# Patient Record
Sex: MALE | Race: WHITE | NOT HISPANIC OR LATINO | Employment: FULL TIME | URBAN - NONMETROPOLITAN AREA
[De-identification: names, ages, dates, MRNs, and addresses within clinical notes are randomized per-mention and may not be internally consistent; named-entity substitution may affect disease eponyms.]

---

## 2017-02-13 RX ORDER — OSELTAMIVIR PHOSPHATE 75 MG/1
75 CAPSULE ORAL DAILY
Qty: 10 CAPSULE | Refills: 0 | Status: SHIPPED | OUTPATIENT
Start: 2017-02-13 | End: 2017-07-26

## 2017-07-26 ENCOUNTER — OFFICE VISIT (OUTPATIENT)
Dept: INTERNAL MEDICINE | Facility: CLINIC | Age: 47
End: 2017-07-26

## 2017-07-26 VITALS
DIASTOLIC BLOOD PRESSURE: 70 MMHG | OXYGEN SATURATION: 98 % | SYSTOLIC BLOOD PRESSURE: 128 MMHG | RESPIRATION RATE: 14 BRPM | TEMPERATURE: 98 F | HEIGHT: 70 IN | WEIGHT: 178 LBS | HEART RATE: 78 BPM | BODY MASS INDEX: 25.48 KG/M2

## 2017-07-26 DIAGNOSIS — E78.5 HYPERLIPIDEMIA, UNSPECIFIED HYPERLIPIDEMIA TYPE: Primary | ICD-10-CM

## 2017-07-26 DIAGNOSIS — R35.1 BPH ASSOCIATED WITH NOCTURIA: ICD-10-CM

## 2017-07-26 DIAGNOSIS — N40.1 BPH ASSOCIATED WITH NOCTURIA: ICD-10-CM

## 2017-07-26 DIAGNOSIS — E55.9 VITAMIN D DEFICIENCY DISEASE: ICD-10-CM

## 2017-07-26 DIAGNOSIS — N40.1 BENIGN NON-NODULAR PROSTATIC HYPERPLASIA WITH LOWER URINARY TRACT SYMPTOMS: ICD-10-CM

## 2017-07-26 PROCEDURE — 99396 PREV VISIT EST AGE 40-64: CPT | Performed by: INTERNAL MEDICINE

## 2017-07-26 NOTE — PROGRESS NOTES
Subjective   Eliazar Dan is a 47 y.o. male and is here for a comprehensive physical exam.     Do you take any herbs or supplements that were not prescribed by a doctor? no  Are you taking calcium supplements? no  Are you taking aspirin daily? no      The following portions of the patient's history were reviewed and updated as appropriate: allergies, current medications, past family history, past medical history, past social history, past surgical history and problem list.      Review of Systems   Constitutional: Negative.    HENT: Negative.    Eyes: Negative.    Respiratory: Negative.    Cardiovascular: Negative.    Gastrointestinal: Negative.    Endocrine: Negative.    Genitourinary: Negative.    Musculoskeletal: Negative.    Skin: Negative.    Allergic/Immunologic: Negative.    Neurological: Negative.    Hematological: Negative.    Psychiatric/Behavioral: Negative.    All other systems reviewed and are negative.        Physical Exam   Constitutional: He is oriented to person, place, and time. He appears well-developed and well-nourished.   HENT:   Head: Normocephalic and atraumatic.   Right Ear: External ear normal.   Left Ear: External ear normal.   Nose: Nose normal.   Mouth/Throat: Oropharynx is clear and moist.   Eyes: Conjunctivae and EOM are normal. Pupils are equal, round, and reactive to light.   Neck: Normal range of motion. Neck supple. No thyromegaly present.   Cardiovascular: Normal rate, regular rhythm, normal heart sounds and intact distal pulses.    Pulmonary/Chest: Effort normal and breath sounds normal.   Abdominal: Soft. Bowel sounds are normal.   Genitourinary: Rectum normal, prostate normal and penis normal.   Musculoskeletal: Normal range of motion.   Neurological: He is alert and oriented to person, place, and time. He has normal reflexes.   Skin: Skin is warm and dry.   Psychiatric: He has a normal mood and affect. His behavior is normal. Judgment and thought content normal.   Nursing  note and vitals reviewed.      All  tests have been reviewed.    Assessment/Plan          1. Patient Counseling:  --Nutrition: Stressed importance of moderation in sodium/caffeine intake, saturated fat and cholesterol, caloric balance, sufficient intake of fresh fruits, vegetables, fiber, calcium and iron.  --Exercise: Stressed the importance of regular exercise.   --Injury prevention: Discussed safety belts, safety helmets, smoke detector, smoking near bedding or upholstery.   --Dental health: Discussed importance of regular tooth brushing, flossing, and dental visits.  --Immunizations reviewed.  --Discussed benefits of screening colonoscopy.  --After hours service discussed with patient    2. Discussed the patient's BMI with him.             Abnormal liver enzyme,do lab  Hyperlipidemia do lab  Right epididymal cyst small 1 cm continue to watch  Skin tags continue watch  BPH nocturia x1 do blood test continue to watch  Plantar fasciitis stable  Diastolic blood pressure slightly elevated cut down salt continue to watch, call if high  Physical today , follow up a year.do labs this month

## 2017-09-26 ENCOUNTER — APPOINTMENT (OUTPATIENT)
Dept: LAB | Facility: HOSPITAL | Age: 47
End: 2017-09-26

## 2017-09-26 LAB
25(OH)D3 SERPL-MCNC: 23.4 NG/ML
ALBUMIN SERPL-MCNC: 4.5 G/DL (ref 3.2–4.8)
ALBUMIN/GLOB SERPL: 2 G/DL (ref 1.5–2.5)
ALP SERPL-CCNC: 66 U/L (ref 25–100)
ALT SERPL W P-5'-P-CCNC: 24 U/L (ref 7–40)
ANION GAP SERPL CALCULATED.3IONS-SCNC: 9 MMOL/L (ref 3–11)
ARTICHOKE IGE QN: 116 MG/DL (ref 0–130)
AST SERPL-CCNC: 23 U/L (ref 0–33)
BASOPHILS # BLD AUTO: 0.03 10*3/MM3 (ref 0–0.2)
BASOPHILS NFR BLD AUTO: 0.4 % (ref 0–1)
BILIRUB SERPL-MCNC: 0.5 MG/DL (ref 0.3–1.2)
BILIRUB UR QL STRIP: NEGATIVE
BUN BLD-MCNC: 15 MG/DL (ref 9–23)
BUN/CREAT SERPL: 16.7 (ref 7–25)
CALCIUM SPEC-SCNC: 9.4 MG/DL (ref 8.7–10.4)
CHLORIDE SERPL-SCNC: 104 MMOL/L (ref 99–109)
CHOLEST SERPL-MCNC: 172 MG/DL (ref 0–200)
CLARITY UR: CLEAR
CO2 SERPL-SCNC: 29 MMOL/L (ref 20–31)
COLOR UR: YELLOW
CREAT BLD-MCNC: 0.9 MG/DL (ref 0.6–1.3)
DEPRECATED RDW RBC AUTO: 41.7 FL (ref 37–54)
EOSINOPHIL # BLD AUTO: 0.13 10*3/MM3 (ref 0–0.3)
EOSINOPHIL NFR BLD AUTO: 1.8 % (ref 0–3)
ERYTHROCYTE [DISTWIDTH] IN BLOOD BY AUTOMATED COUNT: 12.8 % (ref 11.3–14.5)
GFR SERPL CREATININE-BSD FRML MDRD: 90 ML/MIN/1.73
GLOBULIN UR ELPH-MCNC: 2.3 GM/DL
GLUCOSE BLD-MCNC: 92 MG/DL (ref 70–100)
GLUCOSE UR STRIP-MCNC: NEGATIVE MG/DL
HCT VFR BLD AUTO: 48.3 % (ref 38.9–50.9)
HDLC SERPL-MCNC: 42 MG/DL (ref 40–60)
HGB BLD-MCNC: 16.7 G/DL (ref 13.1–17.5)
HGB UR QL STRIP.AUTO: NEGATIVE
IMM GRANULOCYTES # BLD: 0.02 10*3/MM3 (ref 0–0.03)
IMM GRANULOCYTES NFR BLD: 0.3 % (ref 0–0.6)
KETONES UR QL STRIP: NEGATIVE
LEUKOCYTE ESTERASE UR QL STRIP.AUTO: NEGATIVE
LYMPHOCYTES # BLD AUTO: 2.12 10*3/MM3 (ref 0.6–4.8)
LYMPHOCYTES NFR BLD AUTO: 29 % (ref 24–44)
MCH RBC QN AUTO: 31 PG (ref 27–31)
MCHC RBC AUTO-ENTMCNC: 34.6 G/DL (ref 32–36)
MCV RBC AUTO: 89.8 FL (ref 80–99)
MONOCYTES # BLD AUTO: 0.65 10*3/MM3 (ref 0–1)
MONOCYTES NFR BLD AUTO: 8.9 % (ref 0–12)
NEUTROPHILS # BLD AUTO: 4.36 10*3/MM3 (ref 1.5–8.3)
NEUTROPHILS NFR BLD AUTO: 59.6 % (ref 41–71)
NITRITE UR QL STRIP: NEGATIVE
PH UR STRIP.AUTO: 5.5 [PH] (ref 5–8)
PLATELET # BLD AUTO: 236 10*3/MM3 (ref 150–450)
PMV BLD AUTO: 10.2 FL (ref 6–12)
POTASSIUM BLD-SCNC: 3.8 MMOL/L (ref 3.5–5.5)
PROT SERPL-MCNC: 6.8 G/DL (ref 5.7–8.2)
PROT UR QL STRIP: NEGATIVE
PSA SERPL-MCNC: 2.22 NG/ML (ref 0–4)
RBC # BLD AUTO: 5.38 10*6/MM3 (ref 4.2–5.76)
SODIUM BLD-SCNC: 142 MMOL/L (ref 132–146)
SP GR UR STRIP: 1.01 (ref 1–1.03)
TRIGL SERPL-MCNC: 101 MG/DL (ref 0–150)
TSH SERPL DL<=0.05 MIU/L-ACNC: 1.32 MIU/ML (ref 0.35–5.35)
UROBILINOGEN UR QL STRIP: NORMAL
WBC NRBC COR # BLD: 7.31 10*3/MM3 (ref 3.5–10.8)

## 2017-09-26 PROCEDURE — 80053 COMPREHEN METABOLIC PANEL: CPT | Performed by: INTERNAL MEDICINE

## 2017-09-26 PROCEDURE — 85025 COMPLETE CBC W/AUTO DIFF WBC: CPT | Performed by: INTERNAL MEDICINE

## 2017-09-26 PROCEDURE — 82306 VITAMIN D 25 HYDROXY: CPT | Performed by: INTERNAL MEDICINE

## 2017-09-26 PROCEDURE — 80061 LIPID PANEL: CPT | Performed by: INTERNAL MEDICINE

## 2017-09-26 PROCEDURE — 84443 ASSAY THYROID STIM HORMONE: CPT | Performed by: INTERNAL MEDICINE

## 2017-09-26 PROCEDURE — 81003 URINALYSIS AUTO W/O SCOPE: CPT | Performed by: INTERNAL MEDICINE

## 2017-09-26 PROCEDURE — 84153 ASSAY OF PSA TOTAL: CPT | Performed by: INTERNAL MEDICINE

## 2018-09-13 ENCOUNTER — OFFICE VISIT (OUTPATIENT)
Dept: INTERNAL MEDICINE | Facility: CLINIC | Age: 48
End: 2018-09-13

## 2018-09-13 VITALS
WEIGHT: 184 LBS | HEIGHT: 70 IN | OXYGEN SATURATION: 97 % | HEART RATE: 88 BPM | BODY MASS INDEX: 26.34 KG/M2 | SYSTOLIC BLOOD PRESSURE: 138 MMHG | DIASTOLIC BLOOD PRESSURE: 80 MMHG

## 2018-09-13 DIAGNOSIS — E78.5 HYPERLIPIDEMIA, UNSPECIFIED HYPERLIPIDEMIA TYPE: Primary | ICD-10-CM

## 2018-09-13 DIAGNOSIS — Z12.11 COLON CANCER SCREENING: ICD-10-CM

## 2018-09-13 DIAGNOSIS — E55.9 VITAMIN D DEFICIENCY DISEASE: ICD-10-CM

## 2018-09-13 DIAGNOSIS — N40.1 BENIGN NON-NODULAR PROSTATIC HYPERPLASIA WITH LOWER URINARY TRACT SYMPTOMS: ICD-10-CM

## 2018-09-13 PROCEDURE — 99396 PREV VISIT EST AGE 40-64: CPT | Performed by: INTERNAL MEDICINE

## 2018-09-13 NOTE — PROGRESS NOTES
Subjective   Eliazar Dan is a 48 y.o. male and is here for a comprehensive physical exam.     Do you take any herbs or supplements that were not prescribed by a doctor? no  Are you taking calcium supplements? no  Are you taking aspirin daily? no      The following portions of the patient's history were reviewed and updated as appropriate: allergies, current medications, past family history, past medical history, past social history, past surgical history and problem list.      Review of Systems   Constitutional: Negative.    HENT: Negative.    Eyes: Negative.    Respiratory: Negative.    Cardiovascular: Negative.    Gastrointestinal: Negative.    Endocrine: Negative.    Genitourinary: Negative.    Musculoskeletal: Negative.    Skin: Negative.    Allergic/Immunologic: Negative.    Neurological: Negative.    Hematological: Negative.    Psychiatric/Behavioral: Negative.    All other systems reviewed and are negative.        Physical Exam   Constitutional: He is oriented to person, place, and time. He appears well-developed and well-nourished.   HENT:   Head: Normocephalic and atraumatic.   Right Ear: External ear normal.   Left Ear: External ear normal.   Nose: Nose normal.   Mouth/Throat: Oropharynx is clear and moist.   Eyes: Pupils are equal, round, and reactive to light. Conjunctivae and EOM are normal.   Neck: Normal range of motion. Neck supple. No thyromegaly present.   Cardiovascular: Normal rate, regular rhythm, normal heart sounds and intact distal pulses.    Pulmonary/Chest: Effort normal and breath sounds normal.   Abdominal: Soft. Bowel sounds are normal.   Genitourinary: Rectum normal, prostate normal and penis normal.   Musculoskeletal: Normal range of motion.   Neurological: He is alert and oriented to person, place, and time. He has normal reflexes.   Skin: Skin is warm and dry.   Psychiatric: He has a normal mood and affect. His behavior is normal. Judgment and thought content normal.   Nursing  note and vitals reviewed.      All  tests have been reviewed.    Assessment/Plan          1. Patient Counseling:  --Nutrition: Stressed importance of moderation in sodium/caffeine intake, saturated fat and cholesterol, caloric balance, sufficient intake of fresh fruits, vegetables, fiber, calcium and iron.  --Exercise: Stressed the importance of regular exercise.   --Injury prevention: Discussed safety belts, safety helmets, smoke detector, smoking near bedding or upholstery.   --Dental health: Discussed importance of regular tooth brushing, flossing, and dental visits.  --Immunizations reviewed.  --Discussed benefits of screening colonoscopy.  --After hours service discussed with patient    2. Discussed the patient's BMI with him.            Abnormal liver enzyme,do lab  Hyperlipidemia do lab  Right epididymal cyst small 1 cm continue to watch  Skin tags continue watch  BPH nocturia x1   Plantar fasciitis start arth support ice and aleve, stretch  Colonoscopy schedule today  shingrix informed  Physical  annual

## 2018-09-21 ENCOUNTER — TELEPHONE (OUTPATIENT)
Dept: INTERNAL MEDICINE | Facility: CLINIC | Age: 48
End: 2018-09-21

## 2018-10-17 ENCOUNTER — LAB (OUTPATIENT)
Dept: LAB | Facility: HOSPITAL | Age: 48
End: 2018-10-17

## 2018-10-17 DIAGNOSIS — E55.9 AVITAMINOSIS D: ICD-10-CM

## 2018-10-17 DIAGNOSIS — N40.1 BPH ASSOCIATED WITH NOCTURIA: ICD-10-CM

## 2018-10-17 DIAGNOSIS — N40.1 BENIGN NON-NODULAR PROSTATIC HYPERPLASIA WITH LOWER URINARY TRACT SYMPTOMS: Primary | ICD-10-CM

## 2018-10-17 DIAGNOSIS — R35.1 BPH ASSOCIATED WITH NOCTURIA: ICD-10-CM

## 2018-10-17 DIAGNOSIS — E78.5 HYPERLIPIDEMIA, UNSPECIFIED HYPERLIPIDEMIA TYPE: ICD-10-CM

## 2018-10-17 LAB
25(OH)D3 SERPL-MCNC: 21.1 NG/ML
ALBUMIN SERPL-MCNC: 4.55 G/DL (ref 3.2–4.8)
ALBUMIN/GLOB SERPL: 2.2 G/DL (ref 1.5–2.5)
ALP SERPL-CCNC: 66 U/L (ref 25–100)
ALT SERPL W P-5'-P-CCNC: 38 U/L (ref 7–40)
ANION GAP SERPL CALCULATED.3IONS-SCNC: 6 MMOL/L (ref 3–11)
ARTICHOKE IGE QN: 132 MG/DL (ref 0–130)
AST SERPL-CCNC: 28 U/L (ref 0–33)
BASOPHILS # BLD AUTO: 0.02 10*3/MM3 (ref 0–0.2)
BASOPHILS NFR BLD AUTO: 0.3 % (ref 0–1)
BILIRUB SERPL-MCNC: 0.6 MG/DL (ref 0.3–1.2)
BILIRUB UR QL STRIP: NEGATIVE
BUN BLD-MCNC: 13 MG/DL (ref 9–23)
BUN/CREAT SERPL: 13.4 (ref 7–25)
CALCIUM SPEC-SCNC: 9.5 MG/DL (ref 8.7–10.4)
CHLORIDE SERPL-SCNC: 108 MMOL/L (ref 99–109)
CHOLEST SERPL-MCNC: 172 MG/DL (ref 0–200)
CLARITY UR: CLEAR
CO2 SERPL-SCNC: 30 MMOL/L (ref 20–31)
COLOR UR: YELLOW
CREAT BLD-MCNC: 0.97 MG/DL (ref 0.6–1.3)
DEPRECATED RDW RBC AUTO: 39.7 FL (ref 37–54)
EOSINOPHIL # BLD AUTO: 0.05 10*3/MM3 (ref 0–0.3)
EOSINOPHIL NFR BLD AUTO: 0.7 % (ref 0–3)
ERYTHROCYTE [DISTWIDTH] IN BLOOD BY AUTOMATED COUNT: 12.4 % (ref 11.3–14.5)
GFR SERPL CREATININE-BSD FRML MDRD: 82 ML/MIN/1.73
GLOBULIN UR ELPH-MCNC: 2.1 GM/DL
GLUCOSE BLD-MCNC: 92 MG/DL (ref 70–100)
GLUCOSE UR STRIP-MCNC: NEGATIVE MG/DL
HCT VFR BLD AUTO: 47.7 % (ref 38.9–50.9)
HDLC SERPL-MCNC: 36 MG/DL (ref 40–60)
HGB BLD-MCNC: 16.3 G/DL (ref 13.1–17.5)
HGB UR QL STRIP.AUTO: NEGATIVE
IMM GRANULOCYTES # BLD: 0.02 10*3/MM3 (ref 0–0.03)
IMM GRANULOCYTES NFR BLD: 0.3 % (ref 0–0.6)
KETONES UR QL STRIP: NEGATIVE
LEUKOCYTE ESTERASE UR QL STRIP.AUTO: NEGATIVE
LYMPHOCYTES # BLD AUTO: 2.01 10*3/MM3 (ref 0.6–4.8)
LYMPHOCYTES NFR BLD AUTO: 29 % (ref 24–44)
MCH RBC QN AUTO: 30.5 PG (ref 27–31)
MCHC RBC AUTO-ENTMCNC: 34.2 G/DL (ref 32–36)
MCV RBC AUTO: 89.2 FL (ref 80–99)
MONOCYTES # BLD AUTO: 0.6 10*3/MM3 (ref 0–1)
MONOCYTES NFR BLD AUTO: 8.7 % (ref 0–12)
NEUTROPHILS # BLD AUTO: 4.25 10*3/MM3 (ref 1.5–8.3)
NEUTROPHILS NFR BLD AUTO: 61.3 % (ref 41–71)
NITRITE UR QL STRIP: NEGATIVE
PH UR STRIP.AUTO: 5.5 [PH] (ref 5–8)
PLATELET # BLD AUTO: 260 10*3/MM3 (ref 150–450)
PMV BLD AUTO: 9.9 FL (ref 6–12)
POTASSIUM BLD-SCNC: 4.3 MMOL/L (ref 3.5–5.5)
PROT SERPL-MCNC: 6.6 G/DL (ref 5.7–8.2)
PROT UR QL STRIP: NEGATIVE
PSA SERPL-MCNC: 2.88 NG/ML (ref 0–4)
RBC # BLD AUTO: 5.35 10*6/MM3 (ref 4.2–5.76)
SODIUM BLD-SCNC: 144 MMOL/L (ref 132–146)
SP GR UR STRIP: 1.02 (ref 1–1.03)
TRIGL SERPL-MCNC: 64 MG/DL (ref 0–150)
TSH SERPL DL<=0.05 MIU/L-ACNC: 1.32 MIU/ML (ref 0.35–5.35)
UROBILINOGEN UR QL STRIP: NORMAL
WBC NRBC COR # BLD: 6.93 10*3/MM3 (ref 3.5–10.8)

## 2018-10-17 PROCEDURE — 80053 COMPREHEN METABOLIC PANEL: CPT

## 2018-10-17 PROCEDURE — 81003 URINALYSIS AUTO W/O SCOPE: CPT

## 2018-10-17 PROCEDURE — 82306 VITAMIN D 25 HYDROXY: CPT

## 2018-10-17 PROCEDURE — 36415 COLL VENOUS BLD VENIPUNCTURE: CPT

## 2018-10-17 PROCEDURE — 84443 ASSAY THYROID STIM HORMONE: CPT

## 2018-10-17 PROCEDURE — 80061 LIPID PANEL: CPT

## 2018-10-17 PROCEDURE — 84153 ASSAY OF PSA TOTAL: CPT

## 2018-10-17 PROCEDURE — 85025 COMPLETE CBC W/AUTO DIFF WBC: CPT

## 2019-08-05 ENCOUNTER — OFFICE VISIT (OUTPATIENT)
Dept: INTERNAL MEDICINE | Facility: CLINIC | Age: 49
End: 2019-08-05

## 2019-08-05 VITALS
HEIGHT: 70 IN | HEART RATE: 78 BPM | TEMPERATURE: 98.5 F | WEIGHT: 185 LBS | OXYGEN SATURATION: 98 % | BODY MASS INDEX: 26.48 KG/M2 | DIASTOLIC BLOOD PRESSURE: 86 MMHG | SYSTOLIC BLOOD PRESSURE: 128 MMHG

## 2019-08-05 DIAGNOSIS — R42 DIZZINESS: ICD-10-CM

## 2019-08-05 DIAGNOSIS — H61.22 IMPACTED CERUMEN OF LEFT EAR: Primary | ICD-10-CM

## 2019-08-05 PROCEDURE — 69210 REMOVE IMPACTED EAR WAX UNI: CPT | Performed by: PHYSICIAN ASSISTANT

## 2019-08-05 PROCEDURE — 99213 OFFICE O/P EST LOW 20 MIN: CPT | Performed by: PHYSICIAN ASSISTANT

## 2019-08-05 NOTE — PROGRESS NOTES
Eliazar Dan is a 49 y.o. male.     Subjective   History of Present Illness   He had an ache in the left ear and mild sore throat for 1-2 days last week which has since resolved, but since then he has noticed dizziness if he bends forward and stands up quickly.  Dizziness does not occur with any other body position changes.  No rhinorrhea, postnasal drip, cough, headache, sneezing, sinus pressure, fever or chills.  He took Tylenol once which helped the ear ache and sore throat.         The following portions of the patient's history were reviewed and updated as appropriate: allergies, current medications, past family history, past medical history, past social history, past surgical history and problem list.    Review of Systems   Constitutional: Negative for activity change, appetite change, chills, diaphoresis, fatigue, fever and unexpected weight change.   HENT: Positive for ear pain and sore throat. Negative for congestion, dental problem, ear discharge, facial swelling, mouth sores, postnasal drip, rhinorrhea, sinus pressure, trouble swallowing and voice change.    Eyes: Negative for visual disturbance.   Respiratory: Negative for cough, chest tightness, shortness of breath and wheezing.    Cardiovascular: Negative for chest pain, palpitations and leg swelling.   Neurological: Positive for dizziness. Negative for speech difficulty, weakness, numbness and headaches.   Hematological: Negative for adenopathy. Does not bruise/bleed easily.   Psychiatric/Behavioral: Negative for agitation, confusion, dysphoric mood, self-injury and suicidal ideas. The patient is not nervous/anxious.          Objective    Physical Exam   Constitutional: He is oriented to person, place, and time. He appears well-developed and well-nourished. No distress.   HENT:   Head: Normocephalic and atraumatic.   Right Ear: External ear normal.   Mouth/Throat: Oropharynx is clear and moist. No oropharyngeal exudate.   Partial cerumen impaction  of the left ear canal. Visible portion of TM appears normal, though 1/4 of TM is not visible due to impaction.    Eyes: Conjunctivae and EOM are normal. Pupils are equal, round, and reactive to light.   Neck: Normal range of motion. Neck supple.   Cardiovascular: Normal rate, regular rhythm and normal heart sounds. Exam reveals no gallop and no friction rub.   No murmur heard.  Pulmonary/Chest: Effort normal and breath sounds normal. No stridor. No respiratory distress. He has no wheezes. He has no rales. He exhibits no tenderness.   Abdominal: Soft. Bowel sounds are normal. He exhibits no mass. There is no tenderness. No hernia.   Musculoskeletal: Normal range of motion. He exhibits no edema, tenderness or deformity.   Lymphadenopathy:     He has no cervical adenopathy.   Neurological: He is alert and oriented to person, place, and time. He displays normal reflexes. No cranial nerve deficit or sensory deficit. He exhibits normal muscle tone. Coordination normal.   Negative [place] [employer] [place] maneuvers.    Skin: Skin is warm and dry. Capillary refill takes less than 2 seconds. No rash noted. He is not diaphoretic.   Psychiatric: He has a normal mood and affect. His behavior is normal. Judgment and thought content normal.   Nursing note and vitals reviewed.      Ear Cerumen Removal  Date/Time: 8/5/2019 10:37 AM  Performed by: Mary Kay Olguin PA  Authorized by: Mary Kay Olguin PA   Consent: Verbal consent obtained. Written consent not obtained.  Risks and benefits: risks, benefits and alternatives were discussed  Consent given by: patient  Patient understanding: patient states understanding of the procedure being performed  Patient consent: the patient's understanding of the procedure matches consent given  Procedure consent: procedure consent matches procedure scheduled  Required items: required blood products, implants, devices, and special equipment available  Patient identity confirmed: verbally with  "patient  Time out: Immediately prior to procedure a \"time out\" was called to verify the correct patient, procedure, equipment, support staff and site/side marked as required.    Anesthesia:  Local Anesthetic: none  Location details: left ear  Patient tolerance: Patient tolerated the procedure well with no immediate complications  Comments: Left partial cerumen impaction was treated with curette and irrigation, though the impaction did not fully resolve.   Procedure type: instrumentation (And irrigation)      Sedation:  Patient sedated: no          /86   Pulse 78   Temp 98.5 °F (36.9 °C)   Ht 177.8 cm (70\")   Wt 83.9 kg (185 lb)   SpO2 98%   BMI 26.54 kg/m²     Nursing note and vitals reviewed.        Assessment/Plan   Eliazar was seen today for earache.    Diagnoses and all orders for this visit:    Impacted cerumen of left ear  -     Ear Cerumen Removal  Begin Debrox drops in the left ear. Return in 1 week if symptoms fail to resolve.     Dizziness  Begin Flonase daily.         Call or RTC if symptoms worsen or persist.        "

## 2019-09-16 ENCOUNTER — OFFICE VISIT (OUTPATIENT)
Dept: INTERNAL MEDICINE | Facility: CLINIC | Age: 49
End: 2019-09-16

## 2019-09-16 VITALS
DIASTOLIC BLOOD PRESSURE: 80 MMHG | TEMPERATURE: 97.5 F | WEIGHT: 182.8 LBS | SYSTOLIC BLOOD PRESSURE: 124 MMHG | BODY MASS INDEX: 26.17 KG/M2 | OXYGEN SATURATION: 98 % | HEIGHT: 70 IN | HEART RATE: 82 BPM

## 2019-09-16 DIAGNOSIS — N40.1 BENIGN NON-NODULAR PROSTATIC HYPERPLASIA WITH LOWER URINARY TRACT SYMPTOMS: ICD-10-CM

## 2019-09-16 DIAGNOSIS — Z12.11 COLON CANCER SCREENING: ICD-10-CM

## 2019-09-16 DIAGNOSIS — E55.9 VITAMIN D DEFICIENCY DISEASE: ICD-10-CM

## 2019-09-16 DIAGNOSIS — Z00.00 ANNUAL PHYSICAL EXAM: ICD-10-CM

## 2019-09-16 DIAGNOSIS — E78.5 HYPERLIPIDEMIA, UNSPECIFIED HYPERLIPIDEMIA TYPE: Primary | ICD-10-CM

## 2019-09-16 PROCEDURE — 99396 PREV VISIT EST AGE 40-64: CPT | Performed by: INTERNAL MEDICINE

## 2019-09-16 NOTE — PROGRESS NOTES
Subjective   Eliazar Dan is a 49 y.o. male and is here for a comprehensive physical exam.     Do you take any herbs or supplements that were not prescribed by a doctor? no  Are you taking calcium supplements? no  Are you taking aspirin daily? no      The following portions of the patient's history were reviewed and updated as appropriate: allergies, current medications, past family history, past medical history, past social history, past surgical history and problem list.      Review of Systems   Constitutional: Negative.    HENT: Negative.    Eyes: Negative.    Respiratory: Negative.    Cardiovascular: Negative.    Gastrointestinal: Negative.    Endocrine: Negative.    Genitourinary: Negative.    Musculoskeletal: Negative.    Skin: Negative.    Allergic/Immunologic: Negative.    Neurological: Negative.    Hematological: Negative.    Psychiatric/Behavioral: Negative.    All other systems reviewed and are negative.        Physical Exam   Constitutional: He is oriented to person, place, and time. He appears well-developed and well-nourished.   HENT:   Head: Normocephalic and atraumatic.   Right Ear: External ear normal.   Left Ear: External ear normal.   Nose: Nose normal.   Mouth/Throat: Oropharynx is clear and moist.   Eyes: Conjunctivae and EOM are normal. Pupils are equal, round, and reactive to light.   Neck: Normal range of motion. Neck supple. No thyromegaly present.   Cardiovascular: Normal rate, regular rhythm, normal heart sounds and intact distal pulses.   Pulmonary/Chest: Effort normal and breath sounds normal.   Abdominal: Soft. Bowel sounds are normal.   Genitourinary: Rectum normal, prostate normal and penis normal.   Musculoskeletal: Normal range of motion.   Neurological: He is alert and oriented to person, place, and time. He has normal reflexes.   Skin: Skin is warm and dry.   Psychiatric: He has a normal mood and affect. His behavior is normal. Judgment and thought content normal.   Nursing  note and vitals reviewed.      All  tests have been reviewed.    Assessment/Plan          1. Patient Counseling:  --Nutrition: Stressed importance of moderation in sodium/caffeine intake, saturated fat and cholesterol, caloric balance, sufficient intake of fresh fruits, vegetables, fiber, calcium and iron.  --Exercise: Stressed the importance of regular exercise.   --Injury prevention: Discussed safety belts, safety helmets, smoke detector, smoking near bedding or upholstery.   --Dental health: Discussed importance of regular tooth brushing, flossing, and dental visits.  --Immunizations reviewed.  --Discussed benefits of screening colonoscopy.  --After hours service discussed with patient    2. Discussed the patient's BMI with him.                Hyperlipidemia do lab  Right epididymal cyst small 1 cm continue to watch  Skin tags continue watch  BPH nocturia x1   Plantar fasciitis start arth support ice and aleve, stretch  Colonoscopy schedule today  bph 3+ WATCH NOCTURIA X 1  shingrix informed  Physical  annual

## 2019-10-18 ENCOUNTER — APPOINTMENT (OUTPATIENT)
Dept: LAB | Facility: HOSPITAL | Age: 49
End: 2019-10-18

## 2019-10-18 LAB
ALBUMIN SERPL-MCNC: 5 G/DL (ref 3.5–5.2)
ALBUMIN/GLOB SERPL: 1.9 G/DL
ALP SERPL-CCNC: 65 U/L (ref 39–117)
ALT SERPL W P-5'-P-CCNC: 28 U/L (ref 1–41)
ANION GAP SERPL CALCULATED.3IONS-SCNC: 11 MMOL/L (ref 5–15)
AST SERPL-CCNC: 21 U/L (ref 1–40)
BASOPHILS # BLD AUTO: 0.04 10*3/MM3 (ref 0–0.2)
BASOPHILS NFR BLD AUTO: 0.5 % (ref 0–1.5)
BILIRUB SERPL-MCNC: 0.4 MG/DL (ref 0.2–1.2)
BILIRUB UR QL STRIP: NEGATIVE
BUN BLD-MCNC: 14 MG/DL (ref 6–20)
BUN/CREAT SERPL: 15.2 (ref 7–25)
CALCIUM SPEC-SCNC: 9.5 MG/DL (ref 8.6–10.5)
CHLORIDE SERPL-SCNC: 105 MMOL/L (ref 98–107)
CHOLEST SERPL-MCNC: 191 MG/DL (ref 0–200)
CLARITY UR: CLEAR
CO2 SERPL-SCNC: 29 MMOL/L (ref 22–29)
COLOR UR: YELLOW
CREAT BLD-MCNC: 0.92 MG/DL (ref 0.76–1.27)
DEPRECATED RDW RBC AUTO: 37.7 FL (ref 37–54)
EOSINOPHIL # BLD AUTO: 0.11 10*3/MM3 (ref 0–0.4)
EOSINOPHIL NFR BLD AUTO: 1.5 % (ref 0.3–6.2)
ERYTHROCYTE [DISTWIDTH] IN BLOOD BY AUTOMATED COUNT: 11.8 % (ref 12.3–15.4)
GFR SERPL CREATININE-BSD FRML MDRD: 87 ML/MIN/1.73
GLOBULIN UR ELPH-MCNC: 2.7 GM/DL
GLUCOSE BLD-MCNC: 110 MG/DL (ref 65–99)
GLUCOSE UR STRIP-MCNC: NEGATIVE MG/DL
HCT VFR BLD AUTO: 52.7 % (ref 37.5–51)
HDLC SERPL-MCNC: 42 MG/DL (ref 40–60)
HGB BLD-MCNC: 17.4 G/DL (ref 13–17.7)
HGB UR QL STRIP.AUTO: NEGATIVE
IMM GRANULOCYTES # BLD AUTO: 0.04 10*3/MM3 (ref 0–0.05)
IMM GRANULOCYTES NFR BLD AUTO: 0.5 % (ref 0–0.5)
KETONES UR QL STRIP: NEGATIVE
LDLC SERPL CALC-MCNC: 132 MG/DL (ref 0–100)
LDLC/HDLC SERPL: 3.15 {RATIO}
LEUKOCYTE ESTERASE UR QL STRIP.AUTO: NEGATIVE
LYMPHOCYTES # BLD AUTO: 2.15 10*3/MM3 (ref 0.7–3.1)
LYMPHOCYTES NFR BLD AUTO: 28.8 % (ref 19.6–45.3)
MCH RBC QN AUTO: 29.2 PG (ref 26.6–33)
MCHC RBC AUTO-ENTMCNC: 33 G/DL (ref 31.5–35.7)
MCV RBC AUTO: 88.6 FL (ref 79–97)
MONOCYTES # BLD AUTO: 0.64 10*3/MM3 (ref 0.1–0.9)
MONOCYTES NFR BLD AUTO: 8.6 % (ref 5–12)
NEUTROPHILS # BLD AUTO: 4.49 10*3/MM3 (ref 1.7–7)
NEUTROPHILS NFR BLD AUTO: 60.1 % (ref 42.7–76)
NITRITE UR QL STRIP: NEGATIVE
NRBC BLD AUTO-RTO: 0 /100 WBC (ref 0–0.2)
PH UR STRIP.AUTO: 5.5 [PH] (ref 5–8)
PLATELET # BLD AUTO: 245 10*3/MM3 (ref 140–450)
PMV BLD AUTO: 9.5 FL (ref 6–12)
POTASSIUM BLD-SCNC: 4 MMOL/L (ref 3.5–5.2)
PROT SERPL-MCNC: 7.7 G/DL (ref 6–8.5)
PROT UR QL STRIP: NEGATIVE
RBC # BLD AUTO: 5.95 10*6/MM3 (ref 4.14–5.8)
SODIUM BLD-SCNC: 145 MMOL/L (ref 136–145)
SP GR UR STRIP: 1.01 (ref 1–1.03)
TRIGL SERPL-MCNC: 83 MG/DL (ref 0–150)
TSH SERPL DL<=0.05 MIU/L-ACNC: 2.27 UIU/ML (ref 0.27–4.2)
UROBILINOGEN UR QL STRIP: NORMAL
VLDLC SERPL-MCNC: 16.6 MG/DL
WBC NRBC COR # BLD: 7.47 10*3/MM3 (ref 3.4–10.8)

## 2019-10-18 PROCEDURE — 84443 ASSAY THYROID STIM HORMONE: CPT | Performed by: INTERNAL MEDICINE

## 2019-10-18 PROCEDURE — 36415 COLL VENOUS BLD VENIPUNCTURE: CPT | Performed by: INTERNAL MEDICINE

## 2019-10-18 PROCEDURE — 85025 COMPLETE CBC W/AUTO DIFF WBC: CPT | Performed by: INTERNAL MEDICINE

## 2019-10-18 PROCEDURE — 80061 LIPID PANEL: CPT | Performed by: INTERNAL MEDICINE

## 2019-10-18 PROCEDURE — 80053 COMPREHEN METABOLIC PANEL: CPT | Performed by: INTERNAL MEDICINE

## 2019-10-18 PROCEDURE — 81003 URINALYSIS AUTO W/O SCOPE: CPT | Performed by: INTERNAL MEDICINE

## 2019-10-18 PROCEDURE — 84153 ASSAY OF PSA TOTAL: CPT | Performed by: INTERNAL MEDICINE

## 2019-10-19 LAB — PSA SERPL-MCNC: 3.2 NG/ML (ref 0–4)

## 2019-10-20 DIAGNOSIS — N40.1 BENIGN PROSTATIC HYPERPLASIA WITH LOWER URINARY TRACT SYMPTOMS, SYMPTOM DETAILS UNSPECIFIED: Primary | ICD-10-CM

## 2019-10-25 ENCOUNTER — RESULTS ENCOUNTER (OUTPATIENT)
Dept: INTERNAL MEDICINE | Facility: CLINIC | Age: 49
End: 2019-10-25

## 2019-10-25 DIAGNOSIS — N40.1 BENIGN PROSTATIC HYPERPLASIA WITH LOWER URINARY TRACT SYMPTOMS, SYMPTOM DETAILS UNSPECIFIED: ICD-10-CM

## 2020-01-14 RX ORDER — SODIUM CHLORIDE 0.9 % (FLUSH) 0.9 %
10 SYRINGE (ML) INJECTION AS NEEDED
Status: CANCELLED | OUTPATIENT
Start: 2020-01-14

## 2020-01-14 RX ORDER — SODIUM CHLORIDE, SODIUM LACTATE, POTASSIUM CHLORIDE, CALCIUM CHLORIDE 600; 310; 30; 20 MG/100ML; MG/100ML; MG/100ML; MG/100ML
9 INJECTION, SOLUTION INTRAVENOUS CONTINUOUS PRN
Status: CANCELLED | OUTPATIENT
Start: 2020-01-14

## 2020-01-14 RX ORDER — FAMOTIDINE 20 MG/1
20 TABLET, FILM COATED ORAL
Status: CANCELLED | OUTPATIENT
Start: 2020-01-14

## 2020-01-14 RX ORDER — SODIUM CHLORIDE 0.9 % (FLUSH) 0.9 %
10 SYRINGE (ML) INJECTION EVERY 12 HOURS SCHEDULED
Status: CANCELLED | OUTPATIENT
Start: 2020-01-14

## 2020-01-14 RX ORDER — LIDOCAINE HYDROCHLORIDE 10 MG/ML
0.5 INJECTION, SOLUTION EPIDURAL; INFILTRATION; INTRACAUDAL; PERINEURAL ONCE AS NEEDED
Status: CANCELLED | OUTPATIENT
Start: 2020-01-14

## 2020-01-15 ENCOUNTER — HOSPITAL ENCOUNTER (OUTPATIENT)
Facility: HOSPITAL | Age: 50
Setting detail: HOSPITAL OUTPATIENT SURGERY
Discharge: HOME | End: 2020-01-15
Attending: UROLOGY | Admitting: UROLOGY

## 2020-01-15 VITALS
WEIGHT: 185 LBS | HEART RATE: 83 BPM | TEMPERATURE: 97.5 F | BODY MASS INDEX: 27.4 KG/M2 | RESPIRATION RATE: 20 BRPM | SYSTOLIC BLOOD PRESSURE: 141 MMHG | DIASTOLIC BLOOD PRESSURE: 89 MMHG | OXYGEN SATURATION: 96 % | HEIGHT: 69 IN

## 2020-01-15 DIAGNOSIS — R97.20 ELEVATED PSA: ICD-10-CM

## 2020-01-15 PROCEDURE — G0416 PROSTATE BIOPSY, ANY MTHD: HCPCS | Performed by: UROLOGY

## 2020-01-15 PROCEDURE — 25010000003 LIDOCAINE 1 % SOLUTION: Performed by: UROLOGY

## 2020-01-15 RX ORDER — LIDOCAINE HYDROCHLORIDE 10 MG/ML
INJECTION, SOLUTION INFILTRATION; PERINEURAL AS NEEDED
Status: DISCONTINUED | OUTPATIENT
Start: 2020-01-15 | End: 2020-01-15 | Stop reason: HOSPADM

## 2020-01-15 RX ORDER — MAGNESIUM HYDROXIDE 1200 MG/15ML
LIQUID ORAL AS NEEDED
Status: DISCONTINUED | OUTPATIENT
Start: 2020-01-15 | End: 2020-01-15 | Stop reason: HOSPADM

## 2020-01-15 RX ORDER — CIPROFLOXACIN 500 MG/1
500 TABLET, FILM COATED ORAL 2 TIMES DAILY
COMMUNITY
End: 2020-08-25

## 2020-01-15 NOTE — BRIEF OP NOTE
PROSTATE BIOPSY  Progress Note    Eliazar Dan  1/15/2020    Pre-op Diagnosis:   Elevated PSA       Post-Op Diagnosis Codes:  Same    Procedure/CPT® Codes:      Procedure(s):  TRANSRECTAL ULTRASOUND GUIDED BIOPSY of the prostate    Surgeon(s):  Ritesh Ribera MD    Anesthesia: Local  Staff:   Circulator: Dante Wallace RN  Scrub Person: Zena Wood  Other: Amrita Levy RN    Estimated Blood Loss: Less than 10 mL's    Urine Voided: * No values recorded between 1/15/2020  7:35 AM and 1/15/2020  7:55 AM *    Specimens:                Specimens     ID Source Type Tests Collected By Collected At Frozen?      A Prostate Tissue · TISSUE PATHOLOGY EXAM   Ritesh Ribera MD 1/15/20 0743 No     Description: Left sided prostate    This specimen was not marked as sent.    B Prostate Tissue · TISSUE PATHOLOGY EXAM   Ritesh Ribera MD 1/15/20 0745 No     Description: Right Sided Prostate tissue    This specimen was not marked as sent.                Drains: * No LDAs found *    Findings: Normal exam  Complications: None, to recovery room stable    Ritesh Ribera MD     Date: 1/15/2020  Time: 7:55 AM

## 2020-01-15 NOTE — H&P
"Pre-Op H&P  Eliazar Dan  [id number]  1970    Chief complaint: Increased urinary frequency and nocturia    HPI:  Patient is a 50 y.o.male who presents with benign non-nodular prostatic hyperplasia with urinary tract symptoms.  He has an increase PSA velocity, despite absolute value being normal age adjusted.  PSA history shows an increase from 0.8 in 1 out of 15 to 3.2 in 10 out of 19, which is a 400% increase in less than 5 years.  He has complaints of increased urinary frequency, nocturia and abnormal urinary flow, he denies incontinence, dysuria, gross hematuria, prostate pressure and pain.      He presents today for a transrectal ultrasound guided biopsy of the prostate.    Review of Systems:  General ROS: negative for chills, fever or skin lesions;  No changes since last office visit  Cardiovascular ROS: no chest pain or dyspnea on exertion  Respiratory ROS: no cough, shortness of breath, or wheezing    Allergies: No Known Allergies    Home Meds:    No current facility-administered medications on file prior to encounter.      Current Outpatient Medications on File Prior to Encounter   Medication Sig Dispense Refill   • ciprofloxacin (CIPRO) 500 MG tablet Take 500 mg by mouth 2 (Two) Times a Day.         PMH:   Past Medical History:   Diagnosis Date   • Abnormal liver enzymes    • Epididymal cyst     Right, 1 cm continue to watch   • Plantar fasciitis     Stable   • Skin tag      PSH:    Past Surgical History:   Procedure Laterality Date   • WISDOM TOOTH EXTRACTION  2008       Social History:   Tobacco:   Social History     Tobacco Use   Smoking Status Never Smoker   Smokeless Tobacco Never Used      Alcohol:     Social History     Substance and Sexual Activity   Alcohol Use Yes    Comment: Rarely       Vitals:           /92 (BP Location: Right arm, Patient Position: Lying)   Pulse 93   Temp 97.4 °F (36.3 °C) (Temporal)   Resp 18   Ht 175.3 cm (69\")   Wt 83.9 kg (185 lb)   SpO2 97%   BMI " 27.32 kg/m²     Physical Exam:  General Appearance:    Alert, cooperative, no distress, appears stated age   Head:    Normocephalic, without obvious abnormality, atraumatic   Lungs:     Clear to auscultation bilaterally, respirations unlabored    Heart:   Regular rate and rhythm, S1 and S2 normal, no murmur, rub    or gallop    Abdomen:    Soft, non-tender.  +bowel sounds   Breast Exam:    deferred   Genitalia:    deferred   Extremities:   Extremities normal, atraumatic, no cyanosis or edema   Skin:   Skin color, texture, turgor normal, no rashes or lesions   Neurologic:   Grossly intact   Results Review  LABS:  Lab Results   Component Value Date    WBC 7.47 10/18/2019    HGB 17.4 10/18/2019    HCT 52.7 (H) 10/18/2019    MCV 88.6 10/18/2019     10/18/2019    NEUTROABS 4.49 10/18/2019    GLUCOSE 110 (H) 10/18/2019    BUN 14 10/18/2019    CREATININE 0.92 10/18/2019    EGFRIFNONA 87 10/18/2019     10/18/2019    K 4.0 10/18/2019     10/18/2019    CO2 29.0 10/18/2019    CALCIUM 9.5 10/18/2019    ALBUMIN 5.00 10/18/2019    AST 21 10/18/2019    ALT 28 10/18/2019    BILITOT 0.4 10/18/2019       RADIOLOGY:  Imaging Results (Last 72 Hours)     ** No results found for the last 72 hours. **          I reviewed the patient's new clinical results.    Cancer Staging (if applicable)  Cancer Patient: __ yes __no __unknown; If yes, clinical stage T:__ N:__M:__, stage group or __N/A    Impression:  Benign, non-nodular prostatic hyperplasia with urinary tract symptoms    Plan:   Transrectal ultrasound guided biopsy of the prostate    China Irene PA-C   1/15/2020   6:46 AM

## 2020-01-15 NOTE — OP NOTE
Diagnosis: Elevated PSA  Postoperative diagnosis: Same  Procedure performed transrectal ultrasound-guided prostate biopsy  Anesthesia: Local  Surgeon: Bacilio  Indications: This a 50-year-old white male whose had a PSA rise to 3.2 from mild well less than 1 and in previous years.  Represents a dramatic rise in his PSA and his examination of his prostate is unrevealing.  Operative description patient was placed in the operating table in the left lateral cubitus position.  The Front FlipK transrectal ultrasound probe was introduced into the rectum and 10 mL of 1% Xylocaine was injected at the base and apex of the prostate for local anesthesia.  Real-time ultrasonography of the prostate was then performed under direct vision and and measurements taken.  His prostate measures 42.7 mm in width 26.3 mm in height 42.7 mm in length for a volume of 25.1 cm³.  It was symmetrical and homogeneous throughout.  The 18-gauge Biopty gun was then introduced and 6 biopsies were taken from the right and 6 from the left.  He tolerated the procedure well and the probe was removed atraumatically he was taken to the recovery room in stable condition there are no complications.    The hospital dictation system is broken.  This was done on a voice recognition system.  There may be significant transcription errors.

## 2020-01-16 LAB
CYTO UR: NORMAL
LAB AP CASE REPORT: NORMAL
LAB AP CLINICAL INFORMATION: NORMAL
PATH REPORT.FINAL DX SPEC: NORMAL
PATH REPORT.GROSS SPEC: NORMAL

## 2020-06-29 PROCEDURE — U0003 INFECTIOUS AGENT DETECTION BY NUCLEIC ACID (DNA OR RNA); SEVERE ACUTE RESPIRATORY SYNDROME CORONAVIRUS 2 (SARS-COV-2) (CORONAVIRUS DISEASE [COVID-19]), AMPLIFIED PROBE TECHNIQUE, MAKING USE OF HIGH THROUGHPUT TECHNOLOGIES AS DESCRIBED BY CMS-2020-01-R: HCPCS | Performed by: NURSE PRACTITIONER

## 2020-07-01 ENCOUNTER — TELEPHONE (OUTPATIENT)
Dept: URGENT CARE | Facility: CLINIC | Age: 50
End: 2020-07-01

## 2020-07-01 NOTE — TELEPHONE ENCOUNTER
Spoke with patient in regards to negative COVID results.  Advised patient to quarantine for 10 days per CDC recommendation.

## 2020-09-11 ENCOUNTER — APPOINTMENT (OUTPATIENT)
Dept: PREADMISSION TESTING | Facility: HOSPITAL | Age: 50
End: 2020-09-11

## 2020-09-17 ENCOUNTER — OFFICE VISIT (OUTPATIENT)
Dept: INTERNAL MEDICINE | Facility: CLINIC | Age: 50
End: 2020-09-17

## 2020-09-17 VITALS
BODY MASS INDEX: 25.97 KG/M2 | WEIGHT: 181.4 LBS | DIASTOLIC BLOOD PRESSURE: 84 MMHG | HEART RATE: 85 BPM | OXYGEN SATURATION: 97 % | HEIGHT: 70 IN | SYSTOLIC BLOOD PRESSURE: 140 MMHG | TEMPERATURE: 97.8 F

## 2020-09-17 DIAGNOSIS — Z00.00 PE (PHYSICAL EXAM), ANNUAL: Primary | ICD-10-CM

## 2020-09-17 PROCEDURE — 99396 PREV VISIT EST AGE 40-64: CPT | Performed by: INTERNAL MEDICINE

## 2020-09-17 NOTE — PROGRESS NOTES
Subjective   Eliazar Dan is a 50 y.o. male and is here for a comprehensive physical exam.     Do you take any herbs or supplements that were not prescribed by a doctor? no  Are you taking calcium supplements? no  Are you taking aspirin daily? no      The following portions of the patient's history were reviewed and updated as appropriate: allergies, current medications, past family history, past medical history, past social history, past surgical history and problem list.      Review of Systems   Constitutional: Negative.    HENT: Negative.    Eyes: Negative.    Respiratory: Negative.    Cardiovascular: Negative.    Gastrointestinal: Negative.    Endocrine: Negative.    Genitourinary: Negative.    Musculoskeletal: Negative.    Skin: Negative.    Allergic/Immunologic: Negative.    Neurological: Negative.    Hematological: Negative.    Psychiatric/Behavioral: Negative.    All other systems reviewed and are negative.        Physical Exam  Neck:      Musculoskeletal: Neck supple.   Cardiovascular:      Rate and Rhythm: Normal rate and regular rhythm.      Heart sounds: Normal heart sounds.   Pulmonary:      Effort: Pulmonary effort is normal.      Breath sounds: Normal breath sounds.   Abdominal:      General: Bowel sounds are normal.   Skin:     General: Skin is warm.   Neurological:      Mental Status: He is alert and oriented to person, place, and time.         All  tests have been reviewed.    Assessment/Plan          1. Patient Counseling:  --Nutrition: Stressed importance of moderation in sodium/caffeine intake, saturated fat and cholesterol, caloric balance, sufficient intake of fresh fruits, vegetables, fiber, calcium and iron.  --Exercise: Stressed the importance of regular exercise.   --Injury prevention: Discussed safety belts, safety helmets, smoke detector, smoking near bedding or upholstery.   --Dental health: Discussed importance of regular tooth brushing, flossing, and dental  visits.  --Immunizations reviewed.  --Discussed benefits of screening colonoscopy.  --After hours service discussed with patient    2. Discussed the patient's BMI with him.            Hyperlipidemia do lab  Right epididymal cyst small 1 cm continue to watch  Skin tags continue watch  BPH nocturia x1   Colonoscopy scheduled  bph 3+ WATCH NOCTURIA X 1  PSA elevation seen by uro, bx negative, follow up with uro  shingrix informed  Physical  annual

## 2020-09-19 ENCOUNTER — LAB (OUTPATIENT)
Dept: LAB | Facility: HOSPITAL | Age: 50
End: 2020-09-19

## 2020-09-19 LAB
ALBUMIN SERPL-MCNC: 4.4 G/DL (ref 3.5–5.2)
ALBUMIN/GLOB SERPL: 1.4 G/DL
ALP SERPL-CCNC: 72 U/L (ref 39–117)
ALT SERPL W P-5'-P-CCNC: 31 U/L (ref 1–41)
ANION GAP SERPL CALCULATED.3IONS-SCNC: 11.4 MMOL/L (ref 5–15)
AST SERPL-CCNC: 23 U/L (ref 1–40)
BASOPHILS # BLD AUTO: 0.06 10*3/MM3 (ref 0–0.2)
BASOPHILS NFR BLD AUTO: 0.6 % (ref 0–1.5)
BILIRUB SERPL-MCNC: 0.6 MG/DL (ref 0–1.2)
BUN SERPL-MCNC: 12 MG/DL (ref 6–20)
BUN/CREAT SERPL: 12 (ref 7–25)
CALCIUM SPEC-SCNC: 9.8 MG/DL (ref 8.6–10.5)
CHLORIDE SERPL-SCNC: 104 MMOL/L (ref 98–107)
CHOLEST SERPL-MCNC: 172 MG/DL (ref 0–200)
CO2 SERPL-SCNC: 26.6 MMOL/L (ref 22–29)
CREAT SERPL-MCNC: 1 MG/DL (ref 0.76–1.27)
DEPRECATED RDW RBC AUTO: 40.9 FL (ref 37–54)
EOSINOPHIL # BLD AUTO: 0.14 10*3/MM3 (ref 0–0.4)
EOSINOPHIL NFR BLD AUTO: 1.4 % (ref 0.3–6.2)
ERYTHROCYTE [DISTWIDTH] IN BLOOD BY AUTOMATED COUNT: 12.4 % (ref 12.3–15.4)
GFR SERPL CREATININE-BSD FRML MDRD: 78 ML/MIN/1.73
GLOBULIN UR ELPH-MCNC: 3.1 GM/DL
GLUCOSE SERPL-MCNC: 95 MG/DL (ref 65–99)
HCT VFR BLD AUTO: 50.2 % (ref 37.5–51)
HCV AB SER DONR QL: NORMAL
HDLC SERPL-MCNC: 41 MG/DL (ref 40–60)
HGB BLD-MCNC: 16.8 G/DL (ref 13–17.7)
IMM GRANULOCYTES # BLD AUTO: 0.05 10*3/MM3 (ref 0–0.05)
IMM GRANULOCYTES NFR BLD AUTO: 0.5 % (ref 0–0.5)
LDLC SERPL CALC-MCNC: 119 MG/DL (ref 0–100)
LDLC/HDLC SERPL: 2.91 {RATIO}
LYMPHOCYTES # BLD AUTO: 3.06 10*3/MM3 (ref 0.7–3.1)
LYMPHOCYTES NFR BLD AUTO: 29.5 % (ref 19.6–45.3)
MCH RBC QN AUTO: 29.9 PG (ref 26.6–33)
MCHC RBC AUTO-ENTMCNC: 33.5 G/DL (ref 31.5–35.7)
MCV RBC AUTO: 89.3 FL (ref 79–97)
MONOCYTES # BLD AUTO: 0.94 10*3/MM3 (ref 0.1–0.9)
MONOCYTES NFR BLD AUTO: 9.1 % (ref 5–12)
NEUTROPHILS NFR BLD AUTO: 58.9 % (ref 42.7–76)
NEUTROPHILS NFR BLD AUTO: 6.12 10*3/MM3 (ref 1.7–7)
NRBC BLD AUTO-RTO: 0 /100 WBC (ref 0–0.2)
PLATELET # BLD AUTO: 310 10*3/MM3 (ref 140–450)
PMV BLD AUTO: 10.3 FL (ref 6–12)
POTASSIUM SERPL-SCNC: 4.2 MMOL/L (ref 3.5–5.2)
PROT SERPL-MCNC: 7.5 G/DL (ref 6–8.5)
PSA SERPL-MCNC: 3.42 NG/ML (ref 0–4)
RBC # BLD AUTO: 5.62 10*6/MM3 (ref 4.14–5.8)
SODIUM SERPL-SCNC: 142 MMOL/L (ref 136–145)
TRIGL SERPL-MCNC: 58 MG/DL (ref 0–150)
TSH SERPL DL<=0.05 MIU/L-ACNC: 1.16 UIU/ML (ref 0.27–4.2)
VLDLC SERPL-MCNC: 11.6 MG/DL (ref 5–40)
WBC # BLD AUTO: 10.37 10*3/MM3 (ref 3.4–10.8)

## 2020-09-19 PROCEDURE — 80061 LIPID PANEL: CPT | Performed by: INTERNAL MEDICINE

## 2020-09-19 PROCEDURE — 86803 HEPATITIS C AB TEST: CPT | Performed by: INTERNAL MEDICINE

## 2020-09-19 PROCEDURE — 80053 COMPREHEN METABOLIC PANEL: CPT | Performed by: INTERNAL MEDICINE

## 2020-09-19 PROCEDURE — 84153 ASSAY OF PSA TOTAL: CPT | Performed by: INTERNAL MEDICINE

## 2020-09-19 PROCEDURE — 36415 COLL VENOUS BLD VENIPUNCTURE: CPT | Performed by: INTERNAL MEDICINE

## 2020-09-19 PROCEDURE — 85025 COMPLETE CBC W/AUTO DIFF WBC: CPT | Performed by: INTERNAL MEDICINE

## 2020-09-19 PROCEDURE — 84443 ASSAY THYROID STIM HORMONE: CPT | Performed by: INTERNAL MEDICINE

## 2021-01-29 PROCEDURE — U0004 COV-19 TEST NON-CDC HGH THRU: HCPCS | Performed by: NURSE PRACTITIONER

## 2021-02-03 PROCEDURE — U0004 COV-19 TEST NON-CDC HGH THRU: HCPCS | Performed by: NURSE PRACTITIONER

## 2021-09-22 ENCOUNTER — OFFICE VISIT (OUTPATIENT)
Dept: INTERNAL MEDICINE | Facility: CLINIC | Age: 51
End: 2021-09-22

## 2021-09-22 VITALS
WEIGHT: 185 LBS | TEMPERATURE: 97.1 F | HEIGHT: 70 IN | HEART RATE: 76 BPM | SYSTOLIC BLOOD PRESSURE: 122 MMHG | BODY MASS INDEX: 26.48 KG/M2 | DIASTOLIC BLOOD PRESSURE: 80 MMHG | OXYGEN SATURATION: 98 %

## 2021-09-22 DIAGNOSIS — E78.5 HYPERLIPIDEMIA, UNSPECIFIED HYPERLIPIDEMIA TYPE: Primary | ICD-10-CM

## 2021-09-22 DIAGNOSIS — N40.1 BENIGN NON-NODULAR PROSTATIC HYPERPLASIA WITH LOWER URINARY TRACT SYMPTOMS: ICD-10-CM

## 2021-09-22 DIAGNOSIS — Z00.00 ANNUAL PHYSICAL EXAM: ICD-10-CM

## 2021-09-22 DIAGNOSIS — Z12.11 COLON CANCER SCREENING: ICD-10-CM

## 2021-09-22 DIAGNOSIS — E55.9 VITAMIN D DEFICIENCY DISEASE: ICD-10-CM

## 2021-09-22 PROCEDURE — 99396 PREV VISIT EST AGE 40-64: CPT | Performed by: INTERNAL MEDICINE

## 2021-09-22 NOTE — PROGRESS NOTES
Subjective   Eliazar Dan is a 51 y.o. male and is here for a comprehensive physical exam.     Do you take any herbs or supplements that were not prescribed by a doctor? no  Are you taking calcium supplements? no  Are you taking aspirin daily? no      The following portions of the patient's history were reviewed and updated as appropriate: allergies, current medications, past family history, past medical history, past social history, past surgical history and problem list.      Review of Systems   Constitutional: Negative.    HENT: Negative.    Eyes: Negative.    Respiratory: Negative.    Cardiovascular: Negative.    Gastrointestinal: Negative.    Endocrine: Negative.    Genitourinary: Negative.    Musculoskeletal: Negative.    Skin: Negative.    Allergic/Immunologic: Negative.    Neurological: Negative.    Hematological: Negative.    Psychiatric/Behavioral: Negative.    All other systems reviewed and are negative.        Physical Exam  Constitutional:       Appearance: Normal appearance. He is well-developed and normal weight.   HENT:      Head: Normocephalic and atraumatic.      Right Ear: Tympanic membrane, ear canal and external ear normal.      Left Ear: Tympanic membrane, ear canal and external ear normal.      Nose: Nose normal.      Mouth/Throat:      Mouth: Mucous membranes are moist.      Pharynx: Oropharynx is clear.   Eyes:      Conjunctiva/sclera: Conjunctivae normal.      Pupils: Pupils are equal, round, and reactive to light.   Cardiovascular:      Rate and Rhythm: Normal rate and regular rhythm.      Heart sounds: Normal heart sounds.   Pulmonary:      Effort: Pulmonary effort is normal.      Breath sounds: Normal breath sounds.   Abdominal:      General: Bowel sounds are normal.      Palpations: Abdomen is soft.   Genitourinary:     Penis: Normal.       Testes: Normal.      Prostate: Normal.      Rectum: Normal.   Musculoskeletal:      Cervical back: Normal range of motion and neck supple.    Skin:     General: Skin is warm.   Neurological:      Mental Status: He is alert and oriented to person, place, and time.   Psychiatric:         Mood and Affect: Mood normal.         Behavior: Behavior normal.         Thought Content: Thought content normal.         Judgment: Judgment normal.         All  tests have been reviewed.    Assessment/Plan          1. Patient Counseling:  --Nutrition: Stressed importance of moderation in sodium/caffeine intake, saturated fat and cholesterol, caloric balance, sufficient intake of fresh fruits, vegetables, fiber, calcium and iron.  --Exercise: Stressed the importance of regular exercise.   --Injury prevention: Discussed safety belts, safety helmets, smoke detector, smoking near bedding or upholstery.   --Dental health: Discussed importance of regular tooth brushing, flossing, and dental visits.  --Immunizations reviewed.  --Discussed benefits of screening colonoscopy.  --After hours service discussed with patient    2. Discussed the patient's BMI with him.             Hyperlipidemia do lab   Right epididymal cyst small 1 cm continue to watch  Skin tags continue watch   Colonoscopy refused and agree on cologuard  bph 3+ WATCH NOCTURIA X 1 follow-up with urology  PSA elevation seen by uro, bx negative, follow up with uro  shingrix informed  Annual physical

## 2021-09-27 DIAGNOSIS — R97.20 PSA ELEVATION: Primary | ICD-10-CM

## 2021-09-27 LAB
ALBUMIN SERPL-MCNC: 4.9 G/DL (ref 3.5–5.2)
ALBUMIN/GLOB SERPL: 2.3 G/DL
ALP SERPL-CCNC: 67 U/L (ref 39–117)
ALT SERPL-CCNC: 31 U/L (ref 1–41)
AST SERPL-CCNC: 22 U/L (ref 1–40)
BASOPHILS # BLD AUTO: 0.04 10*3/MM3 (ref 0–0.2)
BASOPHILS NFR BLD AUTO: 0.5 % (ref 0–1.5)
BILIRUB SERPL-MCNC: 0.4 MG/DL (ref 0–1.2)
BUN SERPL-MCNC: 16 MG/DL (ref 6–20)
BUN/CREAT SERPL: 17.8 (ref 7–25)
CALCIUM SERPL-MCNC: 9.4 MG/DL (ref 8.6–10.5)
CHLORIDE SERPL-SCNC: 106 MMOL/L (ref 98–107)
CHOLEST SERPL-MCNC: 176 MG/DL (ref 0–200)
CO2 SERPL-SCNC: 25.6 MMOL/L (ref 22–29)
CREAT SERPL-MCNC: 0.9 MG/DL (ref 0.76–1.27)
EOSINOPHIL # BLD AUTO: 0.09 10*3/MM3 (ref 0–0.4)
EOSINOPHIL NFR BLD AUTO: 1.2 % (ref 0.3–6.2)
ERYTHROCYTE [DISTWIDTH] IN BLOOD BY AUTOMATED COUNT: 12.6 % (ref 12.3–15.4)
GLOBULIN SER CALC-MCNC: 2.1 GM/DL
GLUCOSE SERPL-MCNC: 98 MG/DL (ref 65–99)
HCT VFR BLD AUTO: 48.7 % (ref 37.5–51)
HDLC SERPL-MCNC: 39 MG/DL (ref 40–60)
HGB BLD-MCNC: 16.8 G/DL (ref 13–17.7)
IMM GRANULOCYTES # BLD AUTO: 0.03 10*3/MM3 (ref 0–0.05)
IMM GRANULOCYTES NFR BLD AUTO: 0.4 % (ref 0–0.5)
LDLC SERPL CALC-MCNC: 125 MG/DL (ref 0–100)
LYMPHOCYTES # BLD AUTO: 2.16 10*3/MM3 (ref 0.7–3.1)
LYMPHOCYTES NFR BLD AUTO: 28.4 % (ref 19.6–45.3)
MCH RBC QN AUTO: 30.5 PG (ref 26.6–33)
MCHC RBC AUTO-ENTMCNC: 34.5 G/DL (ref 31.5–35.7)
MCV RBC AUTO: 88.4 FL (ref 79–97)
MONOCYTES # BLD AUTO: 0.68 10*3/MM3 (ref 0.1–0.9)
MONOCYTES NFR BLD AUTO: 8.9 % (ref 5–12)
NEUTROPHILS # BLD AUTO: 4.6 10*3/MM3 (ref 1.7–7)
NEUTROPHILS NFR BLD AUTO: 60.6 % (ref 42.7–76)
NRBC BLD AUTO-RTO: 0 /100 WBC (ref 0–0.2)
PLATELET # BLD AUTO: 245 10*3/MM3 (ref 140–450)
POTASSIUM SERPL-SCNC: 4 MMOL/L (ref 3.5–5.2)
PROT SERPL-MCNC: 7 G/DL (ref 6–8.5)
PSA SERPL-MCNC: 5.07 NG/ML (ref 0–4)
RBC # BLD AUTO: 5.51 10*6/MM3 (ref 4.14–5.8)
SODIUM SERPL-SCNC: 142 MMOL/L (ref 136–145)
TRIGL SERPL-MCNC: 62 MG/DL (ref 0–150)
TSH SERPL DL<=0.005 MIU/L-ACNC: 2.17 UIU/ML (ref 0.27–4.2)
VLDLC SERPL CALC-MCNC: 12 MG/DL (ref 5–40)
WBC # BLD AUTO: 7.6 10*3/MM3 (ref 3.4–10.8)

## 2021-09-28 ENCOUNTER — LAB (OUTPATIENT)
Dept: LAB | Facility: HOSPITAL | Age: 51
End: 2021-09-28

## 2021-09-28 PROCEDURE — 36415 COLL VENOUS BLD VENIPUNCTURE: CPT | Performed by: INTERNAL MEDICINE

## 2021-09-28 PROCEDURE — 84153 ASSAY OF PSA TOTAL: CPT | Performed by: INTERNAL MEDICINE

## 2021-09-29 DIAGNOSIS — R97.20 PSA ELEVATION: Primary | ICD-10-CM

## 2021-09-29 LAB — PSA SERPL-MCNC: 5.53 NG/ML (ref 0–4)

## 2021-10-01 ENCOUNTER — TRANSCRIBE ORDERS (OUTPATIENT)
Dept: ADMINISTRATIVE | Facility: HOSPITAL | Age: 51
End: 2021-10-01

## 2021-10-01 DIAGNOSIS — R97.20 ELEVATED PSA: Primary | ICD-10-CM

## 2021-10-25 ENCOUNTER — HOSPITAL ENCOUNTER (OUTPATIENT)
Dept: MRI IMAGING | Facility: HOSPITAL | Age: 51
Discharge: HOME | End: 2021-10-25
Admitting: UROLOGY

## 2021-10-25 DIAGNOSIS — R97.20 ELEVATED PSA: ICD-10-CM

## 2021-10-25 PROCEDURE — A9577 INJ MULTIHANCE: HCPCS | Performed by: UROLOGY

## 2021-10-25 PROCEDURE — 72197 MRI PELVIS W/O & W/DYE: CPT

## 2021-10-25 PROCEDURE — 0 GADOBENATE DIMEGLUMINE 529 MG/ML SOLUTION: Performed by: UROLOGY

## 2021-10-25 RX ADMIN — GADOBENATE DIMEGLUMINE 17 ML: 529 INJECTION, SOLUTION INTRAVENOUS at 09:28

## 2022-03-17 ENCOUNTER — OFFICE VISIT (OUTPATIENT)
Dept: INTERNAL MEDICINE | Facility: CLINIC | Age: 52
End: 2022-03-17

## 2022-03-17 ENCOUNTER — HOSPITAL ENCOUNTER (EMERGENCY)
Facility: HOSPITAL | Age: 52
Discharge: HOME | End: 2022-03-17
Attending: EMERGENCY MEDICINE | Admitting: EMERGENCY MEDICINE

## 2022-03-17 ENCOUNTER — APPOINTMENT (OUTPATIENT)
Dept: GENERAL RADIOLOGY | Facility: HOSPITAL | Age: 52
End: 2022-03-17

## 2022-03-17 VITALS
TEMPERATURE: 99.3 F | SYSTOLIC BLOOD PRESSURE: 132 MMHG | OXYGEN SATURATION: 98 % | BODY MASS INDEX: 26.77 KG/M2 | WEIGHT: 187 LBS | HEIGHT: 70 IN | DIASTOLIC BLOOD PRESSURE: 92 MMHG | RESPIRATION RATE: 16 BRPM | HEART RATE: 86 BPM

## 2022-03-17 VITALS
DIASTOLIC BLOOD PRESSURE: 91 MMHG | SYSTOLIC BLOOD PRESSURE: 143 MMHG | HEART RATE: 85 BPM | TEMPERATURE: 98 F | RESPIRATION RATE: 16 BRPM | OXYGEN SATURATION: 95 % | WEIGHT: 153 LBS | HEIGHT: 70 IN | BODY MASS INDEX: 21.9 KG/M2

## 2022-03-17 DIAGNOSIS — R07.9 CHEST PAIN, UNSPECIFIED TYPE: Primary | ICD-10-CM

## 2022-03-17 DIAGNOSIS — R06.09 DYSPNEA ON EXERTION: Primary | ICD-10-CM

## 2022-03-17 DIAGNOSIS — I10 PRIMARY HYPERTENSION: ICD-10-CM

## 2022-03-17 PROBLEM — I20.0 UNSTABLE ANGINA: Status: ACTIVE | Noted: 2022-03-17

## 2022-03-17 LAB
ALBUMIN SERPL-MCNC: 4.5 G/DL (ref 3.5–5.2)
ALBUMIN/GLOB SERPL: 1.6 G/DL
ALP SERPL-CCNC: 71 U/L (ref 39–117)
ALT SERPL W P-5'-P-CCNC: 30 U/L (ref 1–41)
ANION GAP SERPL CALCULATED.3IONS-SCNC: 14.6 MMOL/L (ref 5–15)
AST SERPL-CCNC: 22 U/L (ref 1–40)
BASOPHILS # BLD AUTO: 0.05 10*3/MM3 (ref 0–0.2)
BASOPHILS NFR BLD AUTO: 0.6 % (ref 0–1.5)
BILIRUB SERPL-MCNC: 0.4 MG/DL (ref 0–1.2)
BUN SERPL-MCNC: 17 MG/DL (ref 6–20)
BUN/CREAT SERPL: 14.7 (ref 7–25)
CALCIUM SPEC-SCNC: 9.6 MG/DL (ref 8.6–10.5)
CHLORIDE SERPL-SCNC: 102 MMOL/L (ref 98–107)
CO2 SERPL-SCNC: 26.4 MMOL/L (ref 22–29)
CREAT SERPL-MCNC: 1.16 MG/DL (ref 0.76–1.27)
DEPRECATED RDW RBC AUTO: 37.1 FL (ref 37–54)
EGFRCR SERPLBLD CKD-EPI 2021: 74.8 ML/MIN/1.73
EOSINOPHIL # BLD AUTO: 0.11 10*3/MM3 (ref 0–0.4)
EOSINOPHIL NFR BLD AUTO: 1.3 % (ref 0.3–6.2)
ERYTHROCYTE [DISTWIDTH] IN BLOOD BY AUTOMATED COUNT: 11.9 % (ref 12.3–15.4)
GLOBULIN UR ELPH-MCNC: 2.8 GM/DL
GLUCOSE SERPL-MCNC: 98 MG/DL (ref 65–99)
HCT VFR BLD AUTO: 46.4 % (ref 37.5–51)
HGB BLD-MCNC: 16.5 G/DL (ref 13–17.7)
HOLD SPECIMEN: NORMAL
HOLD SPECIMEN: NORMAL
IMM GRANULOCYTES # BLD AUTO: 0.04 10*3/MM3 (ref 0–0.05)
IMM GRANULOCYTES NFR BLD AUTO: 0.5 % (ref 0–0.5)
LYMPHOCYTES # BLD AUTO: 2.8 10*3/MM3 (ref 0.7–3.1)
LYMPHOCYTES NFR BLD AUTO: 32.2 % (ref 19.6–45.3)
MCH RBC QN AUTO: 30.4 PG (ref 26.6–33)
MCHC RBC AUTO-ENTMCNC: 35.6 G/DL (ref 31.5–35.7)
MCV RBC AUTO: 85.6 FL (ref 79–97)
MONOCYTES # BLD AUTO: 0.92 10*3/MM3 (ref 0.1–0.9)
MONOCYTES NFR BLD AUTO: 10.6 % (ref 5–12)
NEUTROPHILS NFR BLD AUTO: 4.78 10*3/MM3 (ref 1.7–7)
NEUTROPHILS NFR BLD AUTO: 54.8 % (ref 42.7–76)
NRBC BLD AUTO-RTO: 0 /100 WBC (ref 0–0.2)
PLATELET # BLD AUTO: 255 10*3/MM3 (ref 140–450)
PMV BLD AUTO: 9.4 FL (ref 6–12)
POTASSIUM SERPL-SCNC: 3.5 MMOL/L (ref 3.5–5.2)
PROT SERPL-MCNC: 7.3 G/DL (ref 6–8.5)
RBC # BLD AUTO: 5.42 10*6/MM3 (ref 4.14–5.8)
SODIUM SERPL-SCNC: 143 MMOL/L (ref 136–145)
TROPONIN T SERPL-MCNC: <0.01 NG/ML (ref 0–0.03)
TROPONIN T SERPL-MCNC: <0.01 NG/ML (ref 0–0.03)
WBC NRBC COR # BLD: 8.7 10*3/MM3 (ref 3.4–10.8)
WHOLE BLOOD HOLD SPECIMEN: NORMAL
WHOLE BLOOD HOLD SPECIMEN: NORMAL

## 2022-03-17 PROCEDURE — 80053 COMPREHEN METABOLIC PANEL: CPT

## 2022-03-17 PROCEDURE — 93005 ELECTROCARDIOGRAM TRACING: CPT

## 2022-03-17 PROCEDURE — 85025 COMPLETE CBC W/AUTO DIFF WBC: CPT

## 2022-03-17 PROCEDURE — 84484 ASSAY OF TROPONIN QUANT: CPT | Performed by: NURSE PRACTITIONER

## 2022-03-17 PROCEDURE — 71045 X-RAY EXAM CHEST 1 VIEW: CPT

## 2022-03-17 PROCEDURE — 99214 OFFICE O/P EST MOD 30 MIN: CPT | Performed by: INTERNAL MEDICINE

## 2022-03-17 PROCEDURE — 84484 ASSAY OF TROPONIN QUANT: CPT

## 2022-03-17 PROCEDURE — 99283 EMERGENCY DEPT VISIT LOW MDM: CPT

## 2022-03-17 PROCEDURE — 93000 ELECTROCARDIOGRAM COMPLETE: CPT | Performed by: INTERNAL MEDICINE

## 2022-03-17 RX ORDER — ASPIRIN 325 MG
325 TABLET ORAL ONCE
Status: DISCONTINUED | OUTPATIENT
Start: 2022-03-17 | End: 2022-03-17 | Stop reason: HOSPADM

## 2022-03-17 RX ORDER — ASPIRIN 81 MG/1
81 TABLET ORAL DAILY
Qty: 90 TABLET | Refills: 3 | Status: SHIPPED | OUTPATIENT
Start: 2022-03-17 | End: 2022-03-18 | Stop reason: SDUPTHER

## 2022-03-17 RX ORDER — SODIUM CHLORIDE 0.9 % (FLUSH) 0.9 %
10 SYRINGE (ML) INJECTION AS NEEDED
Status: DISCONTINUED | OUTPATIENT
Start: 2022-03-17 | End: 2022-03-17 | Stop reason: HOSPADM

## 2022-03-17 NOTE — ED PROVIDER NOTES
Subjective   Chief Complaint: Chest pain    History of Present Illness: This is a 52-year-old male patient comes into the ED today after being seen by his primary care doctor and was told to come to the emergency room for evaluation.  Patient states that over the last 2 weeks he has been having chest discomfort when he is exercising or having physical activity.  Patient states that it is a dull pain that radiates to his jaw.  Patient states he is family history of cardiac disease before the age of 50.    Nurses Notes reviewed and agree, including vitals, allergies, social history and prior medical history.                Review of Systems   Cardiovascular: Positive for chest pain.   All other systems reviewed and are negative.      Past Medical History:   Diagnosis Date   • Abnormal liver enzymes    • Epididymal cyst     Right, 1 cm continue to watch   • Plantar fasciitis     Stable   • Skin tag        No Known Allergies    Past Surgical History:   Procedure Laterality Date   • PROSTATE BIOPSY N/A 1/15/2020    Procedure: TRANSRECTAL ULTRASOUND GUIDED BIOPSY;  Surgeon: Ritesh Ribera MD;  Location: [facility];  Service: Urology   • WISDOM TOOTH EXTRACTION  2008       Family History   Problem Relation Age of Onset   • Hyperlipidemia Other    • Hypertension Other    • Heart disease Father    • Hyperlipidemia Father    • Hyperlipidemia Brother    • Hyperlipidemia Sister        Social History     Socioeconomic History   • Marital status:    Tobacco Use   • Smoking status: Never Smoker   • Smokeless tobacco: Never Used   Substance and Sexual Activity   • Alcohol use: Never     Comment: Rarely   • Drug use: Never   • Sexual activity: Yes     Partners: Female           Objective   Physical Exam  Vitals and nursing note reviewed.   Constitutional:       Appearance: Normal appearance.   HENT:      Head: Normocephalic and atraumatic.   Eyes:      Extraocular Movements: Extraocular movements intact.      Pupils:  Pupils are equal, round, and reactive to light.   Cardiovascular:      Rate and Rhythm: Normal rate and regular rhythm.      Pulses: Normal pulses.      Heart sounds: Normal heart sounds.   Pulmonary:      Effort: Pulmonary effort is normal.      Breath sounds: Normal breath sounds.   Abdominal:      General: Bowel sounds are normal.      Palpations: Abdomen is soft.   Musculoskeletal:         General: Normal range of motion.      Cervical back: Normal range of motion and neck supple.   Skin:     General: Skin is warm and dry.      Capillary Refill: Capillary refill takes less than 2 seconds.   Neurological:      Mental Status: He is alert.      GCS: GCS eye subscore is 4. GCS verbal subscore is 5. GCS motor subscore is 6.      Cranial Nerves: Cranial nerves are intact.      Sensory: Sensation is intact.      Motor: Motor function is intact.   Psychiatric:         Attention and Perception: Attention and perception normal.         Mood and Affect: Mood and affect normal.         Speech: Speech normal.         Procedures           ED Course  ED Course as of 03/17/22 1729   Thu Mar 17, 2022   1457 EKG interpreted by me: Sinus rhythm, normal rate, no acute ST elevations, T wave inversions in the inferior leads, this is an abnormal EKG, no previous for comparison [MP]   1728 HEART Score for Major Cardiac Events - MDCalc  Calculated on Mar 17 2022 5:27 PM  3 points -> Low Score (0-3 points) Risk of MACE of 0.9-1.7%. [KH]      ED Course User Index  [KH] Maciel Patterson APRN  [MP] Irineo Del Toro MD                                                 [facility]    Final diagnoses:   Chest pain, unspecified type       ED Disposition  ED Disposition     ED Disposition   Discharge    Condition   Stable    Comment   --             Jose Fernandez MD  [address]  [address]  [facility] [id number] [phone]          Dev Bond MD  [address]  [facility] [address]  [phone]    In 1  week  Follow-up         Medication List      No changes were made to your prescriptions during this visit.          Maciel Patterson, LARRY  03/17/22 9712

## 2022-03-17 NOTE — PROGRESS NOTES
"Subjective     Patient ID: Eliazar Dan is a 52 y.o. male. Patient is here for management of multiple medical problems.     Chief Complaint   Patient presents with   • Neck Pain   • Shoulder Pain     Bilateral     • Earache     History of Present Illness   Neck and shoulder pain worse with walking and soa with ear fullness. Only last few weeks.  A few weeks ago was fine. Now with symptom.     Nov last covid vaccine. Maria M GREENBERG     Pulling pusing not worse.         The following portions of the patient's history were reviewed and updated as appropriate: allergies, current medications, past family history, past medical history, past social history, past surgical history and problem list.    Review of Systems   Constitutional: Negative for fever.   HENT: Positive for ear pain. Negative for rhinorrhea and sore throat.    Respiratory: Positive for shortness of breath. Negative for wheezing.    Cardiovascular: Negative for chest pain and leg swelling.   Gastrointestinal: Negative for abdominal pain and vomiting.   Musculoskeletal: Positive for neck pain.   Skin: Negative for rash.   Neurological: Negative for headaches.       Current Outpatient Medications:   •  aspirin (aspirin) 81 MG EC tablet, Take 1 tablet by mouth Daily., Disp: 90 tablet, Rfl: 3    Objective      Blood pressure 132/92, pulse 86, temperature 99.3 °F (37.4 °C), resp. rate 16, height 177.8 cm (70\"), weight 84.8 kg (187 lb), SpO2 98 %.    Physical Exam     General Appearance:    Alert, cooperative, no distress, appears stated age   Head:    Normocephalic, without obvious abnormality, atraumatic   Eyes:    PERRL, conjunctiva/corneas clear, EOM's intact   Ears:    Normal TM's and external ear canals, both ears   Nose:   Nares normal, septum midline, mucosa normal, no drainage   or sinus tenderness   Throat:   Lips, mucosa, and tongue normal; teeth and gums normal   Neck:   Supple, symmetrical, trachea midline, no adenopathy;        thyroid:  No " enlargement/tenderness/nodules; no carotid    bruit or JVD   Back:     Symmetric, no curvature, ROM normal, no CVA tenderness   Lungs:     Clear to auscultation bilaterally, respirations unlabored   Chest wall:    No tenderness or deformity   Heart:    Regular rate and rhythm, S1 and S2 normal, no murmur,        rub or gallop   Abdomen:     Soft, non-tender, bowel sounds active all four quadrants,     no masses, no organomegaly   Extremities:   Extremities normal, atraumatic, no cyanosis or edema   Pulses:   2+ and symmetric all extremities   Skin:   Skin color, texture, turgor normal, no rashes or lesions   Lymph nodes:   Cervical, supraclavicular, and axillary nodes normal   Neurologic:   CNII-XII intact. Normal strength, sensation and reflexes       throughout      Results for orders placed or performed in visit on 09/27/21   PSA DIAGNOSTIC    Specimen: Blood   Result Value Ref Range    PSA 5.530 (H) 0.000 - 4.000 ng/mL         Assessment/Plan     ECG 12 Lead    Date/Time: 3/17/2022 2:12 PM  Performed by: Irineo Marley MD  Authorized by: Irineo Marley MD   Comparison: not compared with previous ECG   Rhythm: sinus rhythm  Rate: normal  Conduction: conduction normal  ST Segments: ST segments normal  QRS axis: normal  Other: no other findings  Lead: right-sided leads used    Clinical impression: abnormal EKG  Comments: Pt to go to the er.            Q waves in inferior leads. Age indeterminate.      Diagnoses and all orders for this visit:    1. Dyspnea on exertion (Primary)  -     Cancel: Ambulatory Referral to Cardiology  -     Comprehensive Metabolic Panel  -     CBC & Differential  -     TSH  -     T4, Free  -     D-dimer, Quantitative  -     Troponin  -     Ambulatory Referral to Cardiology    2. Primary hypertension  -     Cancel: Ambulatory Referral to Cardiology  -     Comprehensive Metabolic Panel  -     CBC & Differential  -     TSH  -     T4, Free  -     D-dimer, Quantitative  -     Troponin  -      Ambulatory Referral to Cardiology    Other orders  -     aspirin (aspirin) 81 MG EC tablet; Take 1 tablet by mouth Daily.  Dispense: 90 tablet; Refill: 3      No follow-ups on file.     pt will go to er given abnormal ekg findings with chest discomfort and left shoulder pain on exertion  Discussed with Dr Del Toro.       There are no Patient Instructions on file for this visit.     Irineo Marley MD    Assessment/Plan       Answers for HPI/ROS submitted by the patient on 3/16/2022  What is the primary reason for your visit?: Shortness of Breath

## 2022-03-18 ENCOUNTER — OFFICE VISIT (OUTPATIENT)
Dept: CARDIOLOGY | Facility: CLINIC | Age: 52
End: 2022-03-18

## 2022-03-18 VITALS
WEIGHT: 183.4 LBS | HEART RATE: 97 BPM | HEIGHT: 70 IN | OXYGEN SATURATION: 96 % | DIASTOLIC BLOOD PRESSURE: 76 MMHG | BODY MASS INDEX: 26.26 KG/M2 | SYSTOLIC BLOOD PRESSURE: 120 MMHG

## 2022-03-18 DIAGNOSIS — E78.5 HYPERLIPIDEMIA, UNSPECIFIED HYPERLIPIDEMIA TYPE: ICD-10-CM

## 2022-03-18 DIAGNOSIS — I20.0 UNSTABLE ANGINA: Primary | ICD-10-CM

## 2022-03-18 DIAGNOSIS — Z82.49 FAMILY HISTORY OF AORTIC ANEURYSM: ICD-10-CM

## 2022-03-18 PROCEDURE — 99204 OFFICE O/P NEW MOD 45 MIN: CPT | Performed by: INTERNAL MEDICINE

## 2022-03-18 RX ORDER — ATORVASTATIN CALCIUM 40 MG/1
40 TABLET, FILM COATED ORAL DAILY
Qty: 90 TABLET | Refills: 3 | Status: SHIPPED | OUTPATIENT
Start: 2022-03-18 | End: 2022-06-06 | Stop reason: HOSPADM

## 2022-03-18 RX ORDER — ASPIRIN 81 MG/1
81 TABLET ORAL DAILY
Qty: 90 TABLET | Refills: 3 | Status: SHIPPED | OUTPATIENT
Start: 2022-03-18

## 2022-03-18 RX ORDER — METOPROLOL SUCCINATE 25 MG/1
25 TABLET, EXTENDED RELEASE ORAL DAILY
Qty: 90 TABLET | Refills: 3 | Status: SHIPPED | OUTPATIENT
Start: 2022-03-18 | End: 2023-02-27

## 2022-03-18 RX ORDER — NITROGLYCERIN 0.4 MG/1
TABLET SUBLINGUAL
Qty: 25 TABLET | Refills: 5 | Status: ON HOLD | OUTPATIENT
Start: 2022-03-18 | End: 2022-06-06 | Stop reason: SDUPTHER

## 2022-03-18 NOTE — PROGRESS NOTES
[facility]  Cardiology Consultation Note     Eliazar Dan  1970  Requesting Provider: Irineo Marley MD  PCP: Jose Fernandez MD    ID:  Eliazar Dan is a 52 y.o. male who resides in [place], [place]. He works in the IT department at [employer]    REASON FOR CONSULTATION:    • Angina         Dear Dr. Marley:    Thank you for sending Quinton Dan to my office today for evaluation of angina.  He is a 52-year-old gentleman who you saw in the office yesterday.  The patient states that he has had substernal chest discomfort with radiation to his jaw over the last 2 weeks.  The symptoms typically occur when he is walking briskly in the hospital and resolve after 10 to 15 minutes of rest.  He has not used sublingual nitroglycerin but did try taking Tylenol for this.  EKG performed in the office yesterday showed some flipped T waves and Q waves in the inferior leads.  You advised him to go to the emergency room where he was ruled out for myocardial infarction and discharged.    Cardiac risk factors include family history.  His father had a heart attack in his 40s.  His father also had aortic aneurysm a little bit later in life that required repair.        Past Medical History, Past Surgical History, Family history, Social History, and Medications were all reviewed with the patient today and updated as necessary.       Current Outpatient Medications:   •  aspirin (aspirin) 81 MG EC tablet, Take 1 tablet by mouth Daily., Disp: 90 tablet, Rfl: 3  •  atorvastatin (LIPITOR) 40 MG tablet, Take 1 tablet by mouth Daily., Disp: 90 tablet, Rfl: 3  •  metoprolol succinate XL (TOPROL-XL) 25 MG 24 hr tablet, Take 1 tablet by mouth Daily., Disp: 90 tablet, Rfl: 3  •  nitroglycerin (NITROSTAT) 0.4 MG SL tablet, 1 under the tongue as needed for angina, may repeat q5mins for up three doses, Disp: 25 tablet, Rfl: 5  No current facility-administered medications for this  "visit.    No Known Allergies      Past Medical History:   Diagnosis Date   • Abnormal liver enzymes    • Epididymal cyst     Right, 1 cm continue to watch   • Hyperlipidemia    • Plantar fasciitis     Stable   • Skin tag        Past Surgical History:   Procedure Laterality Date   • PROSTATE BIOPSY N/A 01/15/2020    Procedure: TRANSRECTAL ULTRASOUND GUIDED BIOPSY;  Surgeon: Ritesh Ribera MD;  Location: [facility];  Service: Urology   • WISDOM TOOTH EXTRACTION  2008       Family History   Problem Relation Age of Onset   • Heart disease Father    • Hyperlipidemia Father    • Heart attack Father 47   • Hyperlipidemia Sister    • Hyperlipidemia Brother    • Hyperlipidemia Other    • Hypertension Other        Social History     Tobacco Use   • Smoking status: Never Smoker   • Smokeless tobacco: Never Used   Substance Use Topics   • Alcohol use: Never     Comment: Rarely       Review of Systems   Constitutional: Negative for malaise/fatigue.   Eyes: Negative for vision loss in left eye and vision loss in right eye.   Cardiovascular: Positive for chest pain. Negative for dyspnea on exertion, near-syncope, orthopnea, palpitations, paroxysmal nocturnal dyspnea and syncope.   Musculoskeletal: Negative for myalgias.   Neurological: Negative for brief paralysis, excessive daytime sleepiness, focal weakness, numbness, paresthesias and weakness.   All other systems reviewed and are negative.              /76 (BP Location: Right arm, Patient Position: Sitting)   Pulse 97   Ht 177.8 cm (70\")   Wt 83.2 kg (183 lb 6.4 oz)   SpO2 96%   BMI 26.32 kg/m²        Constitutional:       Appearance: Healthy appearance. Well-developed.   Eyes:      General: Lids are normal. No scleral icterus.     Conjunctiva/sclera: Conjunctivae normal.   HENT:      Head: Normocephalic and atraumatic.   Neck:      Thyroid: No thyromegaly.      Vascular: No carotid bruit or JVD.   Pulmonary:      Effort: Pulmonary effort is normal.      Breath " sounds: Normal breath sounds. No wheezing. No rhonchi. No rales.   Cardiovascular:      Normal rate. Regular rhythm.      Murmurs: There is no murmur.      No gallop. No rub.   Pulses:     Intact distal pulses.   Edema:     Peripheral edema absent.   Abdominal:      General: There is no distension.      Palpations: Abdomen is soft. There is no abdominal mass.   Musculoskeletal:      Cervical back: Normal range of motion. Skin:     General: Skin is warm and dry.      Findings: No rash.   Neurological:      General: No focal deficit present.      Mental Status: Alert and oriented to person, place, and time.      Gait: Gait is intact.   Psychiatric:         Attention and Perception: Attention normal.         Mood and Affect: Mood normal.         Behavior: Behavior normal.           Procedures    Lab Results   Component Value Date    CHOL 172 09/19/2020    HDL 39 (L) 09/27/2021    HDL 41 09/19/2020     (H) 09/27/2021    VLDL 12 09/27/2021            Problem List Items Addressed This Visit        Cardiology Problems    Unstable angina (HCC) - Primary    Overview     · New onset anginal symptoms, 3/2022  · EKG (3/17/2020): Sinus rhythm.  Inferior infarct pattern with inferior T wave inversion           Current Assessment & Plan     · CCS class II-III symptoms  · Presently on no antianginals  · Start metoprolol succinate 25 mg daily  · Continue aspirin  · Exercise nuclear stress test in the next week  · Patient advised to contact me if symptoms accelerate in frequency or severity  · Nitroglycerin prescribed with instructions on use           Relevant Medications    nitroglycerin (NITROSTAT) 0.4 MG SL tablet    metoprolol succinate XL (TOPROL-XL) 25 MG 24 hr tablet    aspirin (aspirin) 81 MG EC tablet    Other Relevant Orders    Stress Test With Myocardial Perfusion (1 Day)    Hyperlipidemia    Current Assessment & Plan     · Start atorvastatin 40 mg nightly  · CMP and lipids in 6 weeks           Relevant Medications     atorvastatin (LIPITOR) 40 MG tablet    Other Relevant Orders    Comprehensive Metabolic Panel    Lipid Panel       Other    Family history of aortic aneurysm    Overview     · Father with aortic aneurysm requiring repair           Current Assessment & Plan     · CT angiogram of the chest to assess for aneurysm           Relevant Orders    CT Angiogram Chest With & Without Contrast    CT Abdomen With & Without Contrast                   · Exercise nuclear stress test  · CT angiogram of the chest and abdomen to rule out aneurysm  · Start metoprolol succinate 25 mg daily  · Start atorvastatin 40 mg nightly  · CMP and lipids in 6 weeks  · Patient instructed to contact me via [employer] if anginal symptoms become more frequent or severe  · Sublingual nitroglycerin prescribed  · Follow-up with me after testing            HEDY Sawyer MD, FACC, [facility]  Interventional Cardiology  03/18/22  13:17 EDT

## 2022-03-18 NOTE — ASSESSMENT & PLAN NOTE
· CCS class II-III symptoms  · Presently on no antianginals  · Start metoprolol succinate 25 mg daily  · Continue aspirin  · Exercise nuclear stress test in the next week  · Patient advised to contact me if symptoms accelerate in frequency or severity  · Nitroglycerin prescribed with instructions on use

## 2022-04-11 ENCOUNTER — HOSPITAL ENCOUNTER (OUTPATIENT)
Dept: CT IMAGING | Facility: HOSPITAL | Age: 52
Discharge: HOME | End: 2022-04-11

## 2022-04-11 DIAGNOSIS — Z82.49 FAMILY HISTORY OF AORTIC ANEURYSM: ICD-10-CM

## 2022-04-11 PROCEDURE — 0 IOPAMIDOL PER 1 ML: Performed by: INTERNAL MEDICINE

## 2022-04-11 PROCEDURE — 71275 CT ANGIOGRAPHY CHEST: CPT

## 2022-04-11 PROCEDURE — 74170 CT ABD WO CNTRST FLWD CNTRST: CPT

## 2022-04-11 RX ADMIN — IOPAMIDOL 1 ML: 755 INJECTION, SOLUTION INTRAVENOUS at 09:13

## 2022-04-11 RX ADMIN — IOPAMIDOL 95 ML: 755 INJECTION, SOLUTION INTRAVENOUS at 09:11

## 2022-04-12 PROBLEM — I71.20 THORACIC AORTIC ANEURYSM WITHOUT RUPTURE: Status: ACTIVE | Noted: 2022-04-12

## 2022-04-18 ENCOUNTER — OFFICE VISIT (OUTPATIENT)
Dept: UROLOGY | Facility: CLINIC | Age: 52
End: 2022-04-18

## 2022-04-18 VITALS
RESPIRATION RATE: 17 BRPM | TEMPERATURE: 97.4 F | BODY MASS INDEX: 26.48 KG/M2 | DIASTOLIC BLOOD PRESSURE: 88 MMHG | SYSTOLIC BLOOD PRESSURE: 128 MMHG | HEART RATE: 78 BPM | WEIGHT: 185 LBS | OXYGEN SATURATION: 98 % | HEIGHT: 70 IN

## 2022-04-18 DIAGNOSIS — R39.9 LOWER URINARY TRACT SYMPTOMS (LUTS): ICD-10-CM

## 2022-04-18 DIAGNOSIS — R97.20 ELEVATED PROSTATE SPECIFIC ANTIGEN (PSA): Primary | ICD-10-CM

## 2022-04-18 LAB
BILIRUB BLD-MCNC: NEGATIVE MG/DL
CLARITY, POC: CLEAR
COLOR UR: YELLOW
EXPIRATION DATE: NORMAL
GLUCOSE UR STRIP-MCNC: NEGATIVE MG/DL
KETONES UR QL: NEGATIVE
LEUKOCYTE EST, POC: NEGATIVE
Lab: NORMAL
NITRITE UR-MCNC: NEGATIVE MG/ML
PH UR: 5.5 [PH] (ref 5–8)
PROT UR STRIP-MCNC: NEGATIVE MG/DL
RBC # UR STRIP: NEGATIVE /UL
SP GR UR: 1 (ref 1–1.03)
UROBILINOGEN UR QL: NORMAL

## 2022-04-18 PROCEDURE — 99204 OFFICE O/P NEW MOD 45 MIN: CPT | Performed by: UROLOGY

## 2022-04-18 PROCEDURE — 81003 URINALYSIS AUTO W/O SCOPE: CPT | Performed by: UROLOGY

## 2022-04-18 RX ORDER — TAMSULOSIN HYDROCHLORIDE 0.4 MG/1
1 CAPSULE ORAL NIGHTLY
Qty: 30 CAPSULE | Refills: 11 | Status: SHIPPED | OUTPATIENT
Start: 2022-04-18 | End: 2023-01-31

## 2022-04-18 NOTE — PROGRESS NOTES
Chief Complaint  Elevated PSA    Referring Provider  Jose Fernandez MD    HPI  Mr. Dan is a 52 y.o.  male who presents with an elevated PSA to   PSA    PSA 9/27/21 9/28/21   PSA 5.070 (A) 5.530 (A)   (A) Abnormal value       Comments are available for some flowsheets but are not being displayed.         .  IPSS Questionnaire (AUA-7):  Incomplete emptying  Over the past month, how often have you had a sensation of not emptying your bladder completely after you finish?: About half the time (04/18/22 0918)  Frequency  Over the past month, how often have you had to urinate again less than two hours after you finishing urinating ?: More than half the time (04/18/22 0918)  Intermittency  Over the past month, how often have you found you stopped and started again several time when you urinated ?: Almost always (04/18/22 0918)  Urgency  Over the last month, how difficult  have you found it to postpone urination ?: more than half the time (04/18/22 0918)  Weak Stream  Over the past month, how often have you had a weak urinary stream ?: More than half the time (04/18/22 0918)  Straining  Over the past month, how often have you had to push or strain to begin urination ?: More than half the time (04/18/22 0918)  Nocturia  Over the past month, how many times did you most typically get up to urinate from the time you went to bed until the time you got up in the morning ?: More than half the time (04/18/22 0918)  Quality of life due to urinary symptoms  If you were to spend the rest of your life with your urinary condition the way it is now, how would feel about that?: Mixed - about equally satisfied (04/18/22 0918)    Scores  Total IPSS Score: 27 (04/18/22 0918)  Total Score = Symtomatic Level: severely symptomatic: 20-35 (04/18/22 0918)       Patient denies fevers, chills, nausea, vomiting, constipation, or flank pain.  Past  history is negative for BPH, frequent UTIs, gross hematuria, stones or other renal  "diseases.     He denies shortness of breath, leg swelling, calf pain or bone pain.    Past Medical History  Past Medical History:   Diagnosis Date   • Abnormal liver enzymes    • Epididymal cyst     Right, 1 cm continue to watch   • Hyperlipidemia    • Hypertension    • Plantar fasciitis     Stable   • Skin tag        Past Surgical History  Past Surgical History:   Procedure Laterality Date   • PROSTATE BIOPSY N/A 01/15/2020    Procedure: TRANSRECTAL ULTRASOUND GUIDED BIOPSY;  Surgeon: Ritesh Ribera MD;  Location: [facility];  Service: Urology   • WISDOM TOOTH EXTRACTION  2008       Medications    Current Outpatient Medications:   •  aspirin (aspirin) 81 MG EC tablet, Take 1 tablet by mouth Daily., Disp: 90 tablet, Rfl: 3  •  atorvastatin (LIPITOR) 40 MG tablet, Take 1 tablet by mouth Daily., Disp: 90 tablet, Rfl: 3  •  metoprolol succinate XL (TOPROL-XL) 25 MG 24 hr tablet, Take 1 tablet by mouth Daily., Disp: 90 tablet, Rfl: 3  •  nitroglycerin (NITROSTAT) 0.4 MG SL tablet, 1 under the tongue as needed for angina, may repeat q5mins for up three doses, Disp: 25 tablet, Rfl: 5  •  tamsulosin (FLOMAX) 0.4 MG capsule 24 hr capsule, Take 1 capsule by mouth Every Night., Disp: 30 capsule, Rfl: 11    Allergies  No Known Allergies    Social History  Social History     Tobacco Use   • Smoking status: Never Smoker   • Smokeless tobacco: Never Used   Substance Use Topics   • Alcohol use: Never     Comment: Rarely   • Drug use: Never       Family History  Family History   Problem Relation Age of Onset   • Heart disease Father    • Hyperlipidemia Father    • Heart attack Father 47   • Hyperlipidemia Sister    • Hyperlipidemia Brother    • Hyperlipidemia Other    • Hypertension Other       He has no family history of prostate cancer.    Physical Exam  Visit Vitals  /88   Pulse 78   Temp 97.4 °F (36.3 °C)   Resp 17   Ht 177.8 cm (70\")   Wt 83.9 kg (185 lb)   SpO2 98%   BMI 26.54 kg/m²     A physical exam was notable " for normal habitus.    Genitourinary  Rectal:  Normal tone, no masses.  Prostate:  50 grams.  Symmetric, non-tender, anodular and no induration.      Labs  Brief Urine Lab Results  (Last result in the past 365 days)      Color   Clarity   Blood   Leuk Est   Nitrite   Protein   CREAT   Urine HCG        04/18/22 0848 Yellow   Clear   Negative   Negative   Negative   Negative               Final Diagnosis   1.  PROSTATE, LEFT, CORE NEEDLE BIOPSIES:                Benign prostatic glands and stroma with chronic inflammation                Negative for malignancy  2.  PROSTATE, RIGHT, CORE NEEDLE BIOPSIES:                Benign prostatic glands and stroma with acute and chronic inflammation                Negative for malignancy          Lab Results   Component Value Date    PSA 5.530 (H) 09/28/2021    PSA 5.070 (H) 09/27/2021    PSA 3.420 09/19/2020     CT Angiogram Chest With & Without Contrast  Result Date: 4/11/2022  Mild aneurysmal dilatation of the thoracic aorta, measuring 4.4 cm in greatest transverse dimension. There is no evidence of dissection or other acute process. No additional acute findings in the chest.  Minimally atherosclerotic, nonaneurysmal abdominal aorta. No acute nonvascular findings in the abdomen.  Nonspecific and favored benign 19 mm left adrenal nodule. This likely represents a benign adenoma but is incompletely characterized.  This report was finalized on 4/11/2022 1:48 PM by Liborio Trejo.      CT Abdomen With & Without Contrast  Result Date: 4/11/2022  Mild aneurysmal dilatation of the thoracic aorta, measuring 4.4 cm in greatest transverse dimension. There is no evidence of dissection or other acute process. No additional acute findings in the chest.  Minimally atherosclerotic, nonaneurysmal abdominal aorta. No acute nonvascular findings in the abdomen.  Nonspecific and favored benign 19 mm left adrenal nodule. This likely represents a benign adenoma but is incompletely characterized.   This report was finalized on 4/11/2022 1:48 PM by Liborio Trejo.      I reviewed his MRI and prostate is 60cc on ellipsoid formula    Assessment  Mr. Dan is a 52 y.o.  male who presents with elevated PSA.  This is a new diagnosis of uncertain clinical prognosis. Neg Prostate Bx in 2020. 60cc prostate on mRI, which was done w/o PiRADS so results are not interpretable. Worsening of chronic LUTS. IPSS high.       Plan  1. Start flomax  2. 4 K score and FU in 3-4 weeks tele

## 2022-04-20 ENCOUNTER — APPOINTMENT (OUTPATIENT)
Dept: CARDIOLOGY | Facility: HOSPITAL | Age: 52
End: 2022-04-20

## 2022-04-27 ENCOUNTER — HOSPITAL ENCOUNTER (OUTPATIENT)
Dept: CARDIOLOGY | Facility: HOSPITAL | Age: 52
Discharge: HOME | End: 2022-04-27
Admitting: INTERNAL MEDICINE

## 2022-04-27 VITALS
BODY MASS INDEX: 26.48 KG/M2 | DIASTOLIC BLOOD PRESSURE: 84 MMHG | WEIGHT: 184.97 LBS | HEIGHT: 70 IN | OXYGEN SATURATION: 98 % | SYSTOLIC BLOOD PRESSURE: 139 MMHG | HEART RATE: 78 BPM

## 2022-04-27 DIAGNOSIS — I20.0 UNSTABLE ANGINA: ICD-10-CM

## 2022-04-27 LAB
BH CV REST NUCLEAR ISOTOPE DOSE: 9.2 MCI
BH CV STRESS BP STAGE 1: NORMAL
BH CV STRESS BP STAGE 2: NORMAL
BH CV STRESS DURATION MIN STAGE 1: 3
BH CV STRESS DURATION MIN STAGE 2: 3
BH CV STRESS DURATION MIN STAGE 3: 1
BH CV STRESS DURATION SEC STAGE 1: 0
BH CV STRESS DURATION SEC STAGE 2: 0
BH CV STRESS DURATION SEC STAGE 3: 50
BH CV STRESS GRADE STAGE 1: 10
BH CV STRESS GRADE STAGE 2: 12
BH CV STRESS HR STAGE 1: 123
BH CV STRESS HR STAGE 2: 151
BH CV STRESS HR STAGE 3: 166
BH CV STRESS METS STAGE 1: 5
BH CV STRESS METS STAGE 2: 7.5
BH CV STRESS METS STAGE 3: 10
BH CV STRESS NUCLEAR ISOTOPE DOSE: 33 MCI
BH CV STRESS O2 STAGE 1: 96
BH CV STRESS O2 STAGE 2: 93
BH CV STRESS O2 STAGE 3: 93
BH CV STRESS PROTOCOL 1: NORMAL
BH CV STRESS RECOVERY BP: NORMAL MMHG
BH CV STRESS RECOVERY HR: 133 BPM
BH CV STRESS RECOVERY O2: 96 %
BH CV STRESS SPEED STAGE 1: 1.7
BH CV STRESS SPEED STAGE 2: 2.5
BH CV STRESS SPEED STAGE 3: 3.4
BH CV STRESS STAGE 1: 1
BH CV STRESS STAGE 2: 2
BH CV STRESS STAGE 3: 3
LV EF NUC BP: 79 %
MAXIMAL PREDICTED HEART RATE: 166 BPM
PERCENT MAX PREDICTED HR: 100 %
STRESS BASELINE BP: NORMAL MMHG
STRESS BASELINE HR: 78 BPM
STRESS O2 SAT REST: 98 %
STRESS PERCENT HR: 118 %
STRESS POST ESTIMATED WORKLOAD: 9.8 METS
STRESS POST EXERCISE DUR MIN: 7 MIN
STRESS POST EXERCISE DUR SEC: 50 SEC
STRESS POST O2 SAT PEAK: 93 %
STRESS POST PEAK BP: NORMAL MMHG
STRESS POST PEAK HR: 166 BPM
STRESS TARGET HR: 141 BPM

## 2022-04-27 PROCEDURE — 93018 CV STRESS TEST I&R ONLY: CPT | Performed by: INTERNAL MEDICINE

## 2022-04-27 PROCEDURE — 0 TECHNETIUM SESTAMIBI: Performed by: INTERNAL MEDICINE

## 2022-04-27 PROCEDURE — 0 RUBIDIUM CHLORIDE: Performed by: INTERNAL MEDICINE

## 2022-04-27 PROCEDURE — 78452 HT MUSCLE IMAGE SPECT MULT: CPT | Performed by: INTERNAL MEDICINE

## 2022-04-27 PROCEDURE — 78452 HT MUSCLE IMAGE SPECT MULT: CPT

## 2022-04-27 PROCEDURE — A9555 RB82 RUBIDIUM: HCPCS | Performed by: INTERNAL MEDICINE

## 2022-04-27 PROCEDURE — 93017 CV STRESS TEST TRACING ONLY: CPT

## 2022-04-27 PROCEDURE — A9500 TC99M SESTAMIBI: HCPCS | Performed by: INTERNAL MEDICINE

## 2022-04-27 RX ADMIN — TECHNETIUM TC 99M SESTAMIBI 1 DOSE: 1 INJECTION INTRAVENOUS at 09:25

## 2022-04-27 RX ADMIN — TECHNETIUM TC 99M SESTAMIBI 1 DOSE: 1 INJECTION INTRAVENOUS at 07:50

## 2022-04-28 ENCOUNTER — TELEPHONE (OUTPATIENT)
Dept: CARDIOLOGY | Facility: CLINIC | Age: 52
End: 2022-04-28

## 2022-04-28 DIAGNOSIS — R94.39 ABNORMAL NUCLEAR STRESS TEST: ICD-10-CM

## 2022-04-28 DIAGNOSIS — I20.0 UNSTABLE ANGINA: Primary | ICD-10-CM

## 2022-04-28 NOTE — TELEPHONE ENCOUNTER
Spoke with the patient. He verbalized understanding. Would like to proceed with [facility]. Order placed.

## 2022-04-28 NOTE — TELEPHONE ENCOUNTER
----- Message from Jairo Sawyer IV, MD sent at 4/27/2022  5:44 PM EDT -----  Your stress test is abnormal and suggest a severe stenosis or narrowing of an artery on the bottom side of your heart.  Based on your symptomatology and the stress results, I would recommend proceeding with cardiac catheterization.  Katie will be in contact with you to help schedule this procedure.  Please contact me if you have any questions between now and then.

## 2022-05-03 PROBLEM — R94.39 ABNORMAL NUCLEAR STRESS TEST: Status: ACTIVE | Noted: 2022-05-03

## 2022-05-04 ENCOUNTER — PREP FOR SURGERY (OUTPATIENT)
Dept: OTHER | Facility: HOSPITAL | Age: 52
End: 2022-05-04

## 2022-05-04 DIAGNOSIS — R94.39 ABNORMAL STRESS TEST: Primary | ICD-10-CM

## 2022-05-04 RX ORDER — ASPIRIN 81 MG/1
324 TABLET, CHEWABLE ORAL ONCE
Status: CANCELLED | OUTPATIENT
Start: 2022-05-04 | End: 2022-05-04

## 2022-05-04 RX ORDER — SODIUM CHLORIDE 0.9 % (FLUSH) 0.9 %
10 SYRINGE (ML) INJECTION EVERY 12 HOURS SCHEDULED
Status: CANCELLED | OUTPATIENT
Start: 2022-05-04

## 2022-05-04 RX ORDER — NITROGLYCERIN 0.4 MG/1
0.4 TABLET SUBLINGUAL
Status: CANCELLED | OUTPATIENT
Start: 2022-05-04

## 2022-05-04 RX ORDER — SODIUM CHLORIDE 0.9 % (FLUSH) 0.9 %
10 SYRINGE (ML) INJECTION AS NEEDED
Status: CANCELLED | OUTPATIENT
Start: 2022-05-04

## 2022-05-04 RX ORDER — ASPIRIN 81 MG/1
81 TABLET ORAL DAILY
Status: CANCELLED | OUTPATIENT
Start: 2022-05-05

## 2022-05-04 RX ORDER — ACETAMINOPHEN 325 MG/1
650 TABLET ORAL EVERY 4 HOURS PRN
Status: CANCELLED | OUTPATIENT
Start: 2022-05-04

## 2022-05-18 ENCOUNTER — HOSPITAL ENCOUNTER (OUTPATIENT)
Facility: HOSPITAL | Age: 52
Setting detail: HOSPITAL OUTPATIENT SURGERY
Discharge: HOME | End: 2022-05-18
Attending: INTERNAL MEDICINE | Admitting: INTERNAL MEDICINE

## 2022-05-18 ENCOUNTER — APPOINTMENT (OUTPATIENT)
Dept: CARDIOLOGY | Facility: HOSPITAL | Age: 52
End: 2022-05-18

## 2022-05-18 VITALS
RESPIRATION RATE: 16 BRPM | WEIGHT: 180.12 LBS | HEART RATE: 78 BPM | BODY MASS INDEX: 26.68 KG/M2 | SYSTOLIC BLOOD PRESSURE: 129 MMHG | DIASTOLIC BLOOD PRESSURE: 91 MMHG | HEIGHT: 69 IN | OXYGEN SATURATION: 95 % | TEMPERATURE: 97.7 F

## 2022-05-18 DIAGNOSIS — I25.119 CORONARY ARTERY DISEASE INVOLVING NATIVE CORONARY ARTERY OF NATIVE HEART WITH ANGINA PECTORIS: Primary | ICD-10-CM

## 2022-05-18 DIAGNOSIS — I20.0 UNSTABLE ANGINA: ICD-10-CM

## 2022-05-18 DIAGNOSIS — R94.39 ABNORMAL STRESS TEST: ICD-10-CM

## 2022-05-18 DIAGNOSIS — R94.39 ABNORMAL NUCLEAR STRESS TEST: ICD-10-CM

## 2022-05-18 LAB
ALBUMIN SERPL-MCNC: 4.4 G/DL (ref 3.5–5.2)
ALBUMIN/GLOB SERPL: 1.7 G/DL
ALP SERPL-CCNC: 89 U/L (ref 39–117)
ALT SERPL W P-5'-P-CCNC: 42 U/L (ref 1–41)
ANION GAP SERPL CALCULATED.3IONS-SCNC: 11 MMOL/L (ref 5–15)
AST SERPL-CCNC: 25 U/L (ref 1–40)
BASOPHILS # BLD AUTO: 0.04 10*3/MM3 (ref 0–0.2)
BASOPHILS NFR BLD AUTO: 0.4 % (ref 0–1.5)
BH CV XLRA MEAS LEFT CAROTID BULB EDV: 23.3 CM/SEC
BH CV XLRA MEAS LEFT CAROTID BULB PSV: 69.8 CM/SEC
BH CV XLRA MEAS LEFT DIST CCA EDV: 22.6 CM/SEC
BH CV XLRA MEAS LEFT DIST CCA PSV: 83 CM/SEC
BH CV XLRA MEAS LEFT DIST ICA EDV: 30.8 CM/SEC
BH CV XLRA MEAS LEFT DIST ICA PSV: 77.9 CM/SEC
BH CV XLRA MEAS LEFT ICA/CCA RATIO: 0.76
BH CV XLRA MEAS LEFT MID CCA EDV: 21.4 CM/SEC
BH CV XLRA MEAS LEFT MID CCA PSV: 87.4 CM/SEC
BH CV XLRA MEAS LEFT MID ICA EDV: 35.2 CM/SEC
BH CV XLRA MEAS LEFT MID ICA PSV: 93 CM/SEC
BH CV XLRA MEAS LEFT PROX CCA EDV: 27.9 CM/SEC
BH CV XLRA MEAS LEFT PROX CCA PSV: 113.5 CM/SEC
BH CV XLRA MEAS LEFT PROX ECA EDV: 14.5 CM/SEC
BH CV XLRA MEAS LEFT PROX ECA PSV: 107 CM/SEC
BH CV XLRA MEAS LEFT PROX ICA EDV: 25.8 CM/SEC
BH CV XLRA MEAS LEFT PROX ICA PSV: 66.8 CM/SEC
BH CV XLRA MEAS LEFT PROX SCLA PSV: 145.8 CM/SEC
BH CV XLRA MEAS LEFT VERTEBRAL A EDV: 10.7 CM/SEC
BH CV XLRA MEAS LEFT VERTEBRAL A PSV: 37.4 CM/SEC
BH CV XLRA MEAS RIGHT DIST CCA EDV: 22 CM/SEC
BH CV XLRA MEAS RIGHT DIST CCA PSV: 95.9 CM/SEC
BH CV XLRA MEAS RIGHT DIST ICA EDV: 25 CM/SEC
BH CV XLRA MEAS RIGHT DIST ICA PSV: 69.2 CM/SEC
BH CV XLRA MEAS RIGHT ICA/CCA RATIO: 0.57
BH CV XLRA MEAS RIGHT MID CCA EDV: 24.4 CM/SEC
BH CV XLRA MEAS RIGHT MID CCA PSV: 106 CM/SEC
BH CV XLRA MEAS RIGHT MID ICA EDV: 27 CM/SEC
BH CV XLRA MEAS RIGHT MID ICA PSV: 74.2 CM/SEC
BH CV XLRA MEAS RIGHT PROX CCA EDV: 21.2 CM/SEC
BH CV XLRA MEAS RIGHT PROX CCA PSV: 111.6 CM/SEC
BH CV XLRA MEAS RIGHT PROX ECA EDV: 16.8 CM/SEC
BH CV XLRA MEAS RIGHT PROX ECA PSV: 105 CM/SEC
BH CV XLRA MEAS RIGHT PROX ICA EDV: 22.1 CM/SEC
BH CV XLRA MEAS RIGHT PROX ICA PSV: 60.9 CM/SEC
BH CV XLRA MEAS RIGHT PROX SCLA PSV: 177.4 CM/SEC
BH CV XLRA MEAS RIGHT VERTEBRAL A EDV: 13.6 CM/SEC
BH CV XLRA MEAS RIGHT VERTEBRAL A PSV: 49.9 CM/SEC
BILIRUB SERPL-MCNC: 0.4 MG/DL (ref 0–1.2)
BUN SERPL-MCNC: 14 MG/DL (ref 6–20)
BUN/CREAT SERPL: 16.1 (ref 7–25)
CALCIUM SPEC-SCNC: 9.6 MG/DL (ref 8.6–10.5)
CHLORIDE SERPL-SCNC: 104 MMOL/L (ref 98–107)
CHOLEST SERPL-MCNC: 161 MG/DL (ref 0–200)
CO2 SERPL-SCNC: 27 MMOL/L (ref 22–29)
CREAT BLDA-MCNC: 0.9 MG/DL (ref 0.6–1.3)
CREAT SERPL-MCNC: 0.87 MG/DL (ref 0.76–1.27)
DEPRECATED RDW RBC AUTO: 39.1 FL (ref 37–54)
EGFRCR SERPLBLD CKD-EPI 2021: 102.5 ML/MIN/1.73
EOSINOPHIL # BLD AUTO: 0.05 10*3/MM3 (ref 0–0.4)
EOSINOPHIL NFR BLD AUTO: 0.5 % (ref 0.3–6.2)
ERYTHROCYTE [DISTWIDTH] IN BLOOD BY AUTOMATED COUNT: 12.1 % (ref 12.3–15.4)
GLOBULIN UR ELPH-MCNC: 2.6 GM/DL
GLUCOSE SERPL-MCNC: 108 MG/DL (ref 65–99)
HBA1C MFR BLD: 5.1 % (ref 4.8–5.6)
HCT VFR BLD AUTO: 45.4 % (ref 37.5–51)
HDLC SERPL-MCNC: 38 MG/DL (ref 40–60)
HGB BLD-MCNC: 15.4 G/DL (ref 13–17.7)
IMM GRANULOCYTES # BLD AUTO: 0.05 10*3/MM3 (ref 0–0.05)
IMM GRANULOCYTES NFR BLD AUTO: 0.5 % (ref 0–0.5)
LDLC SERPL CALC-MCNC: 106 MG/DL (ref 0–100)
LDLC/HDLC SERPL: 2.76 {RATIO}
LEFT ARM BP: NORMAL MMHG
LYMPHOCYTES # BLD AUTO: 1.6 10*3/MM3 (ref 0.7–3.1)
LYMPHOCYTES NFR BLD AUTO: 15.1 % (ref 19.6–45.3)
MAXIMAL PREDICTED HEART RATE: 166 BPM
MCH RBC QN AUTO: 30.1 PG (ref 26.6–33)
MCHC RBC AUTO-ENTMCNC: 33.9 G/DL (ref 31.5–35.7)
MCV RBC AUTO: 88.8 FL (ref 79–97)
MONOCYTES # BLD AUTO: 0.68 10*3/MM3 (ref 0.1–0.9)
MONOCYTES NFR BLD AUTO: 6.4 % (ref 5–12)
NEUTROPHILS NFR BLD AUTO: 77.1 % (ref 42.7–76)
NEUTROPHILS NFR BLD AUTO: 8.15 10*3/MM3 (ref 1.7–7)
NRBC BLD AUTO-RTO: 0 /100 WBC (ref 0–0.2)
PLATELET # BLD AUTO: 275 10*3/MM3 (ref 140–450)
PMV BLD AUTO: 9.2 FL (ref 6–12)
POTASSIUM SERPL-SCNC: 4.1 MMOL/L (ref 3.5–5.2)
PROT SERPL-MCNC: 7 G/DL (ref 6–8.5)
RBC # BLD AUTO: 5.11 10*6/MM3 (ref 4.14–5.8)
SODIUM SERPL-SCNC: 142 MMOL/L (ref 136–145)
STRESS TARGET HR: 141 BPM
TRIGL SERPL-MCNC: 91 MG/DL (ref 0–150)
VLDLC SERPL-MCNC: 17 MG/DL (ref 5–40)
WBC NRBC COR # BLD: 10.57 10*3/MM3 (ref 3.4–10.8)

## 2022-05-18 PROCEDURE — 93458 L HRT ARTERY/VENTRICLE ANGIO: CPT | Performed by: INTERNAL MEDICINE

## 2022-05-18 PROCEDURE — 93880 EXTRACRANIAL BILAT STUDY: CPT

## 2022-05-18 PROCEDURE — S0260 H&P FOR SURGERY: HCPCS | Performed by: NURSE PRACTITIONER

## 2022-05-18 PROCEDURE — 25010000002 MIDAZOLAM PER 1 MG: Performed by: INTERNAL MEDICINE

## 2022-05-18 PROCEDURE — C1769 GUIDE WIRE: HCPCS | Performed by: INTERNAL MEDICINE

## 2022-05-18 PROCEDURE — 82565 ASSAY OF CREATININE: CPT

## 2022-05-18 PROCEDURE — 25010000002 FENTANYL CITRATE (PF) 50 MCG/ML SOLUTION: Performed by: INTERNAL MEDICINE

## 2022-05-18 PROCEDURE — C1894 INTRO/SHEATH, NON-LASER: HCPCS | Performed by: INTERNAL MEDICINE

## 2022-05-18 PROCEDURE — 0 IOPAMIDOL PER 1 ML: Performed by: INTERNAL MEDICINE

## 2022-05-18 PROCEDURE — 80053 COMPREHEN METABOLIC PANEL: CPT | Performed by: NURSE PRACTITIONER

## 2022-05-18 PROCEDURE — 85025 COMPLETE CBC W/AUTO DIFF WBC: CPT | Performed by: NURSE PRACTITIONER

## 2022-05-18 PROCEDURE — 80061 LIPID PANEL: CPT | Performed by: NURSE PRACTITIONER

## 2022-05-18 PROCEDURE — 99205 OFFICE O/P NEW HI 60 MIN: CPT | Performed by: THORACIC SURGERY (CARDIOTHORACIC VASCULAR SURGERY)

## 2022-05-18 PROCEDURE — 83036 HEMOGLOBIN GLYCOSYLATED A1C: CPT | Performed by: NURSE PRACTITIONER

## 2022-05-18 PROCEDURE — 93880 EXTRACRANIAL BILAT STUDY: CPT | Performed by: INTERNAL MEDICINE

## 2022-05-18 RX ORDER — MIDAZOLAM HYDROCHLORIDE 1 MG/ML
INJECTION INTRAMUSCULAR; INTRAVENOUS AS NEEDED
Status: DISCONTINUED | OUTPATIENT
Start: 2022-05-18 | End: 2022-05-18 | Stop reason: HOSPADM

## 2022-05-18 RX ORDER — ASPIRIN 81 MG/1
81 TABLET ORAL DAILY
Status: DISCONTINUED | OUTPATIENT
Start: 2022-05-19 | End: 2022-05-18 | Stop reason: HOSPADM

## 2022-05-18 RX ORDER — ACETAMINOPHEN 325 MG/1
650 TABLET ORAL EVERY 4 HOURS PRN
Status: DISCONTINUED | OUTPATIENT
Start: 2022-05-18 | End: 2022-05-18 | Stop reason: HOSPADM

## 2022-05-18 RX ORDER — ONDANSETRON 2 MG/ML
4 INJECTION INTRAMUSCULAR; INTRAVENOUS EVERY 6 HOURS PRN
Status: DISCONTINUED | OUTPATIENT
Start: 2022-05-18 | End: 2022-05-18

## 2022-05-18 RX ORDER — SODIUM CHLORIDE 0.9 % (FLUSH) 0.9 %
10 SYRINGE (ML) INJECTION EVERY 12 HOURS SCHEDULED
Status: DISCONTINUED | OUTPATIENT
Start: 2022-05-18 | End: 2022-05-18 | Stop reason: HOSPADM

## 2022-05-18 RX ORDER — SODIUM CHLORIDE 9 MG/ML
1-3 INJECTION, SOLUTION INTRAVENOUS CONTINUOUS
Status: DISCONTINUED | OUTPATIENT
Start: 2022-05-18 | End: 2022-05-18 | Stop reason: HOSPADM

## 2022-05-18 RX ORDER — CLOPIDOGREL BISULFATE 75 MG/1
75 TABLET ORAL DAILY
Qty: 90 TABLET | Refills: 1 | Status: SHIPPED | OUTPATIENT
Start: 2022-05-18 | End: 2022-11-14

## 2022-05-18 RX ORDER — SODIUM CHLORIDE 9 MG/ML
1-3 INJECTION, SOLUTION INTRAVENOUS CONTINUOUS
Status: DISCONTINUED | OUTPATIENT
Start: 2022-05-18 | End: 2022-05-18 | Stop reason: SDUPTHER

## 2022-05-18 RX ORDER — ASPIRIN 81 MG/1
324 TABLET, CHEWABLE ORAL ONCE
Status: COMPLETED | OUTPATIENT
Start: 2022-05-18 | End: 2022-05-18

## 2022-05-18 RX ORDER — FENTANYL CITRATE 50 UG/ML
INJECTION, SOLUTION INTRAMUSCULAR; INTRAVENOUS AS NEEDED
Status: DISCONTINUED | OUTPATIENT
Start: 2022-05-18 | End: 2022-05-18 | Stop reason: HOSPADM

## 2022-05-18 RX ORDER — NITROGLYCERIN 0.4 MG/1
0.4 TABLET SUBLINGUAL
Status: DISCONTINUED | OUTPATIENT
Start: 2022-05-18 | End: 2022-05-18 | Stop reason: HOSPADM

## 2022-05-18 RX ORDER — SODIUM CHLORIDE 0.9 % (FLUSH) 0.9 %
10 SYRINGE (ML) INJECTION AS NEEDED
Status: DISCONTINUED | OUTPATIENT
Start: 2022-05-18 | End: 2022-05-18 | Stop reason: HOSPADM

## 2022-05-18 RX ORDER — LIDOCAINE HYDROCHLORIDE 10 MG/ML
INJECTION, SOLUTION EPIDURAL; INFILTRATION; INTRACAUDAL; PERINEURAL AS NEEDED
Status: DISCONTINUED | OUTPATIENT
Start: 2022-05-18 | End: 2022-05-18 | Stop reason: HOSPADM

## 2022-05-18 RX ADMIN — SODIUM CHLORIDE 3 ML/KG/HR: 9 INJECTION, SOLUTION INTRAVENOUS at 10:30

## 2022-05-18 RX ADMIN — ASPIRIN 81 MG 243 MG: 81 TABLET ORAL at 10:50

## 2022-05-18 NOTE — CONSULTS
CTS Consult    Patient Care Team:  Jose Fernandez MD as PCP - General (Internal Medicine)  Jairo Sawyer IV, MD as Consulting Physician (Interventional Cardiology)      Requesting physician: Jalil Sawyer MD    Reason for Consultation: 4.4 cm ascending aortic aneurysm    Chief complaint: Chest pain with intermittent dyspnea on exertion    HPI  The patient is a 52-year-old male with a history of hypertension and dyslipidemia who was most recently evaluated by Dr. Jalil Sawyer in office on 3/18/2022 after having presented to the [facility] emergency department with a worsening 2-week history of substernal chest pain and a several month history of worsening dyspnea on exertion.  The patient's work-up in the emergency room was unremarkable for acute coronary syndrome of the patient ultimately underwent a myocardial perfusion imaging study which revealed inferior wall ischemia.  In addition, the patient underwent a CT angiogram of the chest which revealed a 4.4 cm ascending aortic aneurysm.  The patient subsequently presented to [facility] on 5/18/2022 and underwent a left heart catheterization which revealed proximal LAD stenosis and right coronary artery stenosis.  Currently, patient denies any chest pain or dyspnea.      Review of Systems    A comprehensive review of systems was negative except for:   Constitutional: Denies any fever, chills or recent weight loss or weight gain  Respiratory: Denies cough, recent ammonia bronchitis or history of asthma chronic obstructive pulmonary disease  Cardiovascular: Denies chest pain, palpitations, dysrhythmia or pedal edema  Gastrointestinal: Denies nausea or vomiting, hematemesis, hematochezia, melena history peptic ulcer disease  Hematologic/lymphatic: Denies history of coagulopathy, thrombogenic disorder history of deep vein thrombosis or pulmonary embolus  Neurological: Denies history of cerebrovascular accident, transient  ischemic attack or seizure disorder.  However does have a history of headaches.      History  Past Medical History:   Diagnosis Date   • Abnormal liver enzymes    • Epididymal cyst     Right, 1 cm continue to watch   • Hyperlipidemia    • Hypertension    • Plantar fasciitis     Stable   • Skin tag      Past Surgical History:   Procedure Laterality Date   • PROSTATE BIOPSY N/A 01/15/2020    Procedure: TRANSRECTAL ULTRASOUND GUIDED BIOPSY;  Surgeon: Ritesh Ribera MD;  Location: [facility];  Service: Urology   • WISDOM TOOTH EXTRACTION  2008     Family History   Problem Relation Age of Onset   • Heart disease Father    • Hyperlipidemia Father    • Heart attack Father 47   • Hyperlipidemia Sister    • Hyperlipidemia Brother    • Hyperlipidemia Other    • Hypertension Other      Social History     Tobacco Use   • Smoking status: Never Smoker   • Smokeless tobacco: Never Used   Substance Use Topics   • Alcohol use: Never     Comment: Rarely   • Drug use: Never     Medications Prior to Admission   Medication Sig Dispense Refill Last Dose   • aspirin (aspirin) 81 MG EC tablet Take 1 tablet by mouth Daily. 90 tablet 3 5/18/2022 at 0730   • atorvastatin (LIPITOR) 40 MG tablet Take 1 tablet by mouth Daily. 90 tablet 3 5/18/2022 at 0730   • metoprolol succinate XL (TOPROL-XL) 25 MG 24 hr tablet Take 1 tablet by mouth Daily. 90 tablet 3 5/18/2022 at 0730   • tamsulosin (FLOMAX) 0.4 MG capsule 24 hr capsule Take 1 capsule by mouth Every Night. 30 capsule 11 5/17/2022 at Unknown time   • nitroglycerin (NITROSTAT) 0.4 MG SL tablet 1 under the tongue as needed for angina, may repeat q5mins for up three doses 25 tablet 5 More than a month at Unknown time     Allergies:  Patient has no known allergies.    Objective    Vital Signs  Temp:  [97.7 °F (36.5 °C)] 97.7 °F (36.5 °C)  Heart Rate:  [72-93] 72  Resp:  [16] 16  BP: (108-145)/(67-96) 122/80    Physical Exam:  General Appearance: Well-developed, well-nourished in no acute  distress  HEENT: Normocephalic, atraumatic, PERRLA, EOMs intact with mucous membranes moist with no scleral icterus  Neck: Neck is supple without bruits with no thyromegaly or lymphadenopathy  Lungs: Respirations even and unlabored and clear to auscultation bilaterally with no wheezes, rales or rhonchi  Heart: Regular rate and rhythm with no murmurs, rubs or gallops.  Normal S1-S2 with no jugular venous distention appreciated  Abdomen: Soft, nontender/nondistended with normoactive bowel sounds no rebound or guarding and no organomegaly appreciated  Extremities: Warm with good color and well-perfused with no bilateral lower extremity edema  Pulses: 2+ carotid pulses bilaterally, 2+ radial pulses bilaterally, 2+ femoral pulses bilaterally, 2+ dorsalis pedis and posterior tibialis pulses bilaterally  Skin: No clubbing or cyanosis with no lesions or rashes appreciated  Neurologic: Alert and oriented x3 with cranial nerves II through XII grossly intact with no motor or sensory deficits appreciated            Data Review:  Results from last 7 days   Lab Units 05/18/22  1020   WBC 10*3/mm3 10.57   HEMOGLOBIN g/dL 15.4   HEMATOCRIT % 45.4   PLATELETS 10*3/mm3 275     Results from last 7 days   Lab Units 05/18/22  1031 05/18/22  1020   SODIUM mmol/L  --  142   POTASSIUM mmol/L  --  4.1   CHLORIDE mmol/L  --  104   CO2 mmol/L  --  27.0   BUN mg/dL  --  14   CREATININE mg/dL 0.90 0.87   GLUCOSE mg/dL  --  108*   CALCIUM mg/dL  --  9.6     Coagulation: No results found for: INR, APTT      Left heart catheterization:  Report is unavailable at this time however it appears patient has approximately 90% proximal LAD stenosis and right coronary stenosis    4/11/2022 CT angiogram of the chest:  FINDINGS:  Chest: No pathologic axillary adenopathy or other worrisome body wall  soft tissue findings in the chest. No pleural or pericardial effusion.  No distinct pathologic mediastinal or hilar lymphadenopathy. Evaluation  of the osseous  structures demonstrates no evidence of fracture or  suspicious osseous lesion. Evaluation of the lung fields demonstrates  some mild dependent atelectasis, otherwise without evidence of acute  infectious process or suspicious pulmonary nodularity. Mildly aneurysmal  dilatation of the ascending thoracic aorta, measuring 4.4 cm in greatest  transverse dimension. There is no evidence of intramural hematoma,  dissection or other acute process. The pulmonary arteries are relatively  well-opacified and without evidence of filling defect concerning for  proximal pulmonary embolus.     Abdomen and pelvis: The body wall soft tissues are unremarkable. The  osseous structures demonstrate no evidence of fracture or suspicious  osseous lesion. The liver demonstrates mild diffuse low attenuation  compatible with steatosis. A low-density 17 mm finding centrally in the  right hepatic lobe is incompletely characterized on arterial phase exam,  probable simple cyst. The spleen and pancreas demonstrate homogeneous  attenuation without evidence of suspicious focal lesion. A nonspecific  1.9 cm left adrenal gland nodule is present, incompletely characterized.  Small and large bowel loops are nondilated. There is no suspicious focal  bowel wall thickening. There is no retroperitoneal lymphadenopathy. The  kidneys demonstrate symmetric enhancement, without evidence of  hydronephrosis or nephropathy. There is no free fluid or  pneumoperitoneum. Mildly atherosclerotic nonaneurysmal abdominal aorta  measuring up to 2.0 cm in greatest transverse dimension. The celiac,  superior mesenteric, bilateral renal and inferior mesenteric artery  origins are patent. Incidental note is made of triplicated right renal  artery.        IMPRESSION:  Mild aneurysmal dilatation of the thoracic aorta, measuring 4.4 cm in  greatest transverse dimension. There is no evidence of dissection or  other acute process. No additional acute findings in the chest.      Minimally atherosclerotic, nonaneurysmal abdominal aorta. No acute  nonvascular findings in the abdomen.     Nonspecific and favored benign 19 mm left adrenal nodule. This likely  represents a benign adenoma but is incompletely characterized.      Assessment:        Coronary artery disease involving native coronary artery of native heart with angina pectoris (HCC)    Hyperlipidemia LDL goal <70    Thoracic aortic aneurysm without rupture (HCC)    4.4 cm Ascending Aortic Aneurysm      Plan:  The patient has a 4.4 cm ascending aortic aneurysm which will require continued monitoring with a repeat CT of the chest within 6 months.  The patient's LAD stenosis will likely be managed by cardiology with a PCI and stent placement.  The patient will need continued monitoring of his blood pressure with tight blood pressure control due to the presence of his ascending aortic aneurysm.      KIMMY Chisholm  05/18/22  17:24 EDT

## 2022-05-18 NOTE — H&P
[facility] at [facility]  Cardiovascular History and Physical Note         Patient is a 52-year-old gentleman with no prior cardiac history but history of hypertension and hyperlipidemia who presents today to undergo a left cardiac catheterization for signs and symptoms concerning for unstable angina as well as an abnormal stress test suggesting inferior ischemia.  He was initially evaluated by cardiology as a new consult in March after being referred by his primary care provider for a 2-week history of chest pain.  He had went to [facility] emergency room in March after EKG obtained in his PCP office showed flipped T waves and Q waves in the inferior leads.  He ruled out for an ACS with negative biomarkers and was discharged home.  The patient has since underwent a myocardial perfusion study which showed inferior ST elevation and frequent PVCs with stress and the imaging indicated inferior wall ischemia.     Cardiac risk factors: Male gender, hypertension, hyperlipidemia    Past medical and surgical history, social and family history reviewed in EMR.    REVIEW OF SYSTEMS:   H&P ROS reviewed and pertinent CV ROS as noted in HPI.         Vital Sign Min/Max for last 24 hours  Temp  Min: 97.7 °F (36.5 °C)  Max: 97.7 °F (36.5 °C)   BP  Min: 138/88  Max: 145/87   Pulse  Min: 90  Max: 90   Resp  Min: 16  Max: 16   SpO2  Min: 98 %  Max: 98 %   No data recorded    No intake or output data in the 24 hours ending 05/18/22 1023        Constitutional:       Appearance: Healthy appearance. Well-developed.   Eyes:      General: Lids are normal. No scleral icterus.     Conjunctiva/sclera: Conjunctivae normal.   HENT:      Head: Normocephalic and atraumatic.   Neck:      Thyroid: No thyromegaly.      Vascular: No carotid bruit or JVD.   Pulmonary:      Effort: Pulmonary effort is normal.      Breath sounds: Normal breath sounds. No wheezing. No rhonchi. No rales.   Cardiovascular:      Normal  rate. Regular rhythm.      Murmurs: There is no murmur.      No gallop. No rub.   Pulses:     Intact distal pulses.   Edema:     Peripheral edema absent.   Abdominal:      General: There is no distension.      Palpations: Abdomen is soft. There is no abdominal mass.   Musculoskeletal:      Cervical back: Normal range of motion. Skin:     General: Skin is warm and dry.      Findings: No rash.   Neurological:      General: No focal deficit present.      Mental Status: Alert and oriented to person, place, and time.      Gait: Gait is intact.   Psychiatric:         Attention and Perception: Attention normal.         Mood and Affect: Mood normal.         Behavior: Behavior normal.         Lab Review:   Labs reviewed in the electronic medical record.  Pertinent findings include:  Lab Results   Component Value Date    GLUCOSE 98 03/17/2022    BUN 17 03/17/2022    CREATININE 1.16 03/17/2022    EGFRIFNONA 88 09/27/2021    EGFRIFAFRI 107 09/27/2021    BCR 14.7 03/17/2022    K 3.5 03/17/2022    CO2 26.4 03/17/2022    CALCIUM 9.6 03/17/2022    PROTENTOTREF 7.0 09/27/2021    ALBUMIN 4.50 03/17/2022    LABIL2 2.3 09/27/2021    AST 22 03/17/2022    ALT 30 03/17/2022     Lab Results   Component Value Date    WBC 8.70 03/17/2022    HGB 16.5 03/17/2022    HCT 46.4 03/17/2022    MCV 85.6 03/17/2022     03/17/2022     Lab Results   Component Value Date    CHOL 172 09/19/2020    CHLPL 176 09/27/2021    TRIG 62 09/27/2021    HDL 39 (L) 09/27/2021     (H) 09/27/2021                Active Hospital Problems    Diagnosis    • **Unstable angina (HCC)      · New onset anginal symptoms, 3/2022  · EKG (3/17/2020): Sinus rhythm.  Inferior infarct pattern with inferior T wave inversion  · No obvious coronary calcification on CT chest, 4/11/2022  · Exercise nuclear stress (4/27/2022): Inferior ischemia with development of ST elevation on EKG with exercise     • Abnormal nuclear stress test      · Exercise nuclear stress (4/27/2022):  Exercise-induced ST elevation at peak exercise.  Moderate inferior ischemia.  LVEF 70%     • Hyperlipidemia LDL goal <70      • High intensity statin therapy indicated given the presence of CAD       Patient presents today to undergo cardiac catheterization for abnormal stress test that suggest inferior ischemia as well as signs and symptoms concerning for unstable angina.  The risks and benefits of the procedures were discussed and the patient is agreeable to proceed.  The patient is not on a P2 Y 12 inhibitor at home.  He has been premedicated with 324 mg aspirin today.     · Lab results pending and if acceptable proceed with cardiac cath via the right radial approach  · Further recommendations to follow      LARRY Chambers

## 2022-05-19 DIAGNOSIS — I25.119 CORONARY ARTERY DISEASE INVOLVING NATIVE CORONARY ARTERY OF NATIVE HEART WITH ANGINA PECTORIS: Primary | ICD-10-CM

## 2022-05-23 ENCOUNTER — HOSPITAL ENCOUNTER (OUTPATIENT)
Dept: CARDIOLOGY | Facility: HOSPITAL | Age: 52
Discharge: HOME | End: 2022-05-23
Admitting: INTERNAL MEDICINE

## 2022-05-23 DIAGNOSIS — I25.119 CORONARY ARTERY DISEASE INVOLVING NATIVE CORONARY ARTERY OF NATIVE HEART WITH ANGINA PECTORIS: ICD-10-CM

## 2022-05-23 LAB
BH CV ECHO MEAS - AO MAX PG: 9.1 MMHG
BH CV ECHO MEAS - AO MEAN PG: 4 MMHG
BH CV ECHO MEAS - AO ROOT DIAM: 3.4 CM
BH CV ECHO MEAS - AO V2 MAX: 151 CM/SEC
BH CV ECHO MEAS - AO V2 VTI: 29.8 CM
BH CV ECHO MEAS - AVA(I,D): 3.4 CM2
BH CV ECHO MEAS - EDV(CUBED): 86.4 ML
BH CV ECHO MEAS - EDV(MOD-SP2): 86 ML
BH CV ECHO MEAS - EDV(MOD-SP4): 88 ML
BH CV ECHO MEAS - EF(MOD-BP): 63 %
BH CV ECHO MEAS - EF(MOD-SP2): 62.8 %
BH CV ECHO MEAS - EF(MOD-SP4): 62.5 %
BH CV ECHO MEAS - ESV(CUBED): 22.7 ML
BH CV ECHO MEAS - ESV(MOD-SP2): 32 ML
BH CV ECHO MEAS - ESV(MOD-SP4): 33 ML
BH CV ECHO MEAS - FS: 36 %
BH CV ECHO MEAS - IVS/LVPW: 0.86 CM
BH CV ECHO MEAS - IVSD: 0.81 CM
BH CV ECHO MEAS - LA DIMENSION: 3.3 CM
BH CV ECHO MEAS - LV DIASTOLIC VOL/BSA (35-75): 44.1 CM2
BH CV ECHO MEAS - LV MASS(C)D: 124.1 GRAMS
BH CV ECHO MEAS - LV MAX PG: 6.9 MMHG
BH CV ECHO MEAS - LV MEAN PG: 3 MMHG
BH CV ECHO MEAS - LV SYSTOLIC VOL/BSA (12-30): 16.5 CM2
BH CV ECHO MEAS - LV V1 MAX: 131 CM/SEC
BH CV ECHO MEAS - LV V1 VTI: 24.7 CM
BH CV ECHO MEAS - LVIDD: 4.4 CM
BH CV ECHO MEAS - LVIDS: 2.8 CM
BH CV ECHO MEAS - LVOT AREA: 4.2 CM2
BH CV ECHO MEAS - LVOT DIAM: 2.3 CM
BH CV ECHO MEAS - LVPWD: 0.94 CM
BH CV ECHO MEAS - MV A MAX VEL: 71.6 CM/SEC
BH CV ECHO MEAS - MV DEC SLOPE: 542 CM/SEC2
BH CV ECHO MEAS - MV DEC TIME: 0.18 MSEC
BH CV ECHO MEAS - MV E MAX VEL: 77 CM/SEC
BH CV ECHO MEAS - MV E/A: 1.08
BH CV ECHO MEAS - MV P1/2T: 48.1 MSEC
BH CV ECHO MEAS - MVA(P1/2T): 4.6 CM2
BH CV ECHO MEAS - PA ACC SLOPE: 1152 CM/SEC2
BH CV ECHO MEAS - PA ACC TIME: 0.1 SEC
BH CV ECHO MEAS - PA PR(ACCEL): 34.9 MMHG
BH CV ECHO MEAS - RAP SYSTOLE: 3 MMHG
BH CV ECHO MEAS - RVSP: 22.2 MMHG
BH CV ECHO MEAS - SI(MOD-SP2): 27.1 ML/M2
BH CV ECHO MEAS - SI(MOD-SP4): 27.6 ML/M2
BH CV ECHO MEAS - SV(LVOT): 102.6 ML
BH CV ECHO MEAS - SV(MOD-SP2): 54 ML
BH CV ECHO MEAS - SV(MOD-SP4): 55 ML
BH CV ECHO MEAS - TAPSE (>1.6): 2.6 CM
BH CV ECHO MEAS - TR MAX PG: 19.2 MMHG
BH CV ECHO MEAS - TR MAX VEL: 219 CM/SEC
BH CV XLRA - RV BASE: 3.6 CM
BH CV XLRA - RV LENGTH: 5.1 CM
BH CV XLRA - RV MID: 2.9 CM
LEFT ATRIUM VOLUME INDEX: 35.5 ML/M2
LV EF 2D ECHO EST: 62 %
MAXIMAL PREDICTED HEART RATE: 166 BPM
STRESS TARGET HR: 141 BPM

## 2022-05-23 PROCEDURE — 93306 TTE W/DOPPLER COMPLETE: CPT | Performed by: INTERNAL MEDICINE

## 2022-05-23 PROCEDURE — 93306 TTE W/DOPPLER COMPLETE: CPT

## 2022-05-27 ENCOUNTER — OFFICE VISIT (OUTPATIENT)
Dept: UROLOGY | Facility: CLINIC | Age: 52
End: 2022-05-27

## 2022-05-27 DIAGNOSIS — R97.20 ELEVATED PROSTATE SPECIFIC ANTIGEN (PSA): Primary | ICD-10-CM

## 2022-05-27 DIAGNOSIS — R39.9 LOWER URINARY TRACT SYMPTOMS (LUTS): ICD-10-CM

## 2022-05-27 PROCEDURE — 99442 PR PHYS/QHP TELEPHONE EVALUATION 11-20 MIN: CPT | Performed by: UROLOGY

## 2022-05-27 NOTE — PROGRESS NOTES
52 y.o.  male who presents with elevated PSA.  This is a new diagnosis of uncertain clinical prognosis. Neg Prostate Bx in 2020. 60cc prostate on mRI, which was done w/o PiRADS so results are not interpretable. Worsening of chronic LUTS. IPSS high.         12 minute tele conversation   We reviewed patient's very low risk of having a positive biopsy, therefore we will forego biopsy.  We will focus on his lower urinary tract symptoms and perform a BPH work-up.    You have chosen to receive care through a telephone visit. Do you consent to use a telephone visit for your medical care today? Yes

## 2022-06-02 ENCOUNTER — PREP FOR SURGERY (OUTPATIENT)
Dept: OTHER | Facility: HOSPITAL | Age: 52
End: 2022-06-02

## 2022-06-02 DIAGNOSIS — I25.119 CORONARY ARTERY DISEASE INVOLVING NATIVE CORONARY ARTERY OF NATIVE HEART WITH ANGINA PECTORIS: Primary | ICD-10-CM

## 2022-06-02 RX ORDER — SODIUM CHLORIDE 0.9 % (FLUSH) 0.9 %
10 SYRINGE (ML) INJECTION AS NEEDED
Status: CANCELLED | OUTPATIENT
Start: 2022-06-02

## 2022-06-02 RX ORDER — ASPIRIN 81 MG/1
324 TABLET, CHEWABLE ORAL ONCE
Status: CANCELLED | OUTPATIENT
Start: 2022-06-02 | End: 2022-06-02

## 2022-06-02 RX ORDER — SODIUM CHLORIDE 0.9 % (FLUSH) 0.9 %
10 SYRINGE (ML) INJECTION EVERY 12 HOURS SCHEDULED
Status: CANCELLED | OUTPATIENT
Start: 2022-06-02

## 2022-06-02 RX ORDER — ASPIRIN 81 MG/1
81 TABLET ORAL DAILY
Status: CANCELLED | OUTPATIENT
Start: 2022-06-03

## 2022-06-06 ENCOUNTER — HOSPITAL ENCOUNTER (OUTPATIENT)
Facility: HOSPITAL | Age: 52
Setting detail: HOSPITAL OUTPATIENT SURGERY
Discharge: HOME | End: 2022-06-06
Attending: INTERNAL MEDICINE | Admitting: INTERNAL MEDICINE

## 2022-06-06 VITALS
SYSTOLIC BLOOD PRESSURE: 100 MMHG | HEART RATE: 66 BPM | RESPIRATION RATE: 18 BRPM | TEMPERATURE: 97.3 F | DIASTOLIC BLOOD PRESSURE: 76 MMHG | WEIGHT: 176.4 LBS | BODY MASS INDEX: 26.13 KG/M2 | HEIGHT: 69 IN | OXYGEN SATURATION: 96 %

## 2022-06-06 DIAGNOSIS — E78.5 HYPERLIPIDEMIA LDL GOAL <70: Primary | Chronic | ICD-10-CM

## 2022-06-06 DIAGNOSIS — I25.119 CORONARY ARTERY DISEASE INVOLVING NATIVE CORONARY ARTERY OF NATIVE HEART WITH ANGINA PECTORIS: ICD-10-CM

## 2022-06-06 DIAGNOSIS — I20.0 UNSTABLE ANGINA: ICD-10-CM

## 2022-06-06 LAB
ALBUMIN SERPL-MCNC: 4.4 G/DL (ref 3.5–5.2)
ALBUMIN/GLOB SERPL: 1.7 G/DL
ALP SERPL-CCNC: 94 U/L (ref 39–117)
ALT SERPL W P-5'-P-CCNC: 28 U/L (ref 1–41)
ANION GAP SERPL CALCULATED.3IONS-SCNC: 13 MMOL/L (ref 5–15)
ANION GAP SERPL CALCULATED.3IONS-SCNC: 9 MMOL/L (ref 5–15)
AST SERPL-CCNC: 23 U/L (ref 1–40)
BILIRUB SERPL-MCNC: 0.5 MG/DL (ref 0–1.2)
BUN SERPL-MCNC: 10 MG/DL (ref 6–20)
BUN SERPL-MCNC: 11 MG/DL (ref 6–20)
BUN/CREAT SERPL: 11.2 (ref 7–25)
BUN/CREAT SERPL: 11.5 (ref 7–25)
CALCIUM SPEC-SCNC: 9.2 MG/DL (ref 8.6–10.5)
CALCIUM SPEC-SCNC: 9.9 MG/DL (ref 8.6–10.5)
CHLORIDE SERPL-SCNC: 104 MMOL/L (ref 98–107)
CHLORIDE SERPL-SCNC: 104 MMOL/L (ref 98–107)
CHOLEST SERPL-MCNC: 149 MG/DL (ref 0–200)
CO2 SERPL-SCNC: 25 MMOL/L (ref 22–29)
CO2 SERPL-SCNC: 29 MMOL/L (ref 22–29)
CREAT BLDA-MCNC: 0.8 MG/DL (ref 0.6–1.3)
CREAT SERPL-MCNC: 0.89 MG/DL (ref 0.76–1.27)
CREAT SERPL-MCNC: 0.96 MG/DL (ref 0.76–1.27)
DEPRECATED RDW RBC AUTO: 38.6 FL (ref 37–54)
EGFRCR SERPLBLD CKD-EPI 2021: 101.8 ML/MIN/1.73
EGFRCR SERPLBLD CKD-EPI 2021: 93.9 ML/MIN/1.73
ERYTHROCYTE [DISTWIDTH] IN BLOOD BY AUTOMATED COUNT: 12 % (ref 12.3–15.4)
GLOBULIN UR ELPH-MCNC: 2.6 GM/DL
GLUCOSE SERPL-MCNC: 110 MG/DL (ref 65–99)
GLUCOSE SERPL-MCNC: 113 MG/DL (ref 65–99)
HBA1C MFR BLD: 5 % (ref 4.8–5.6)
HCT VFR BLD AUTO: 46.8 % (ref 37.5–51)
HDLC SERPL-MCNC: 35 MG/DL (ref 40–60)
HGB BLD-MCNC: 15.9 G/DL (ref 13–17.7)
LDLC SERPL CALC-MCNC: 98 MG/DL (ref 0–100)
LDLC/HDLC SERPL: 2.79 {RATIO}
MCH RBC QN AUTO: 29.8 PG (ref 26.6–33)
MCHC RBC AUTO-ENTMCNC: 34 G/DL (ref 31.5–35.7)
MCV RBC AUTO: 87.6 FL (ref 79–97)
PLATELET # BLD AUTO: 244 10*3/MM3 (ref 140–450)
PMV BLD AUTO: 9.6 FL (ref 6–12)
POTASSIUM SERPL-SCNC: 4 MMOL/L (ref 3.5–5.2)
POTASSIUM SERPL-SCNC: 4 MMOL/L (ref 3.5–5.2)
PROT SERPL-MCNC: 7 G/DL (ref 6–8.5)
RBC # BLD AUTO: 5.34 10*6/MM3 (ref 4.14–5.8)
SODIUM SERPL-SCNC: 142 MMOL/L (ref 136–145)
SODIUM SERPL-SCNC: 142 MMOL/L (ref 136–145)
TRIGL SERPL-MCNC: 81 MG/DL (ref 0–150)
VLDLC SERPL-MCNC: 16 MG/DL (ref 5–40)
WBC NRBC COR # BLD: 7.41 10*3/MM3 (ref 3.4–10.8)

## 2022-06-06 PROCEDURE — 93454 CORONARY ARTERY ANGIO S&I: CPT

## 2022-06-06 PROCEDURE — 25010000002 MIDAZOLAM PER 1 MG: Performed by: INTERNAL MEDICINE

## 2022-06-06 PROCEDURE — C9600 PERC DRUG-EL COR STENT SING: HCPCS | Performed by: INTERNAL MEDICINE

## 2022-06-06 PROCEDURE — 82565 ASSAY OF CREATININE: CPT

## 2022-06-06 PROCEDURE — 25010000002 HEPARIN (PORCINE) PER 1000 UNITS: Performed by: INTERNAL MEDICINE

## 2022-06-06 PROCEDURE — 80053 COMPREHEN METABOLIC PANEL: CPT | Performed by: INTERNAL MEDICINE

## 2022-06-06 PROCEDURE — 0 IOPAMIDOL PER 1 ML: Performed by: INTERNAL MEDICINE

## 2022-06-06 PROCEDURE — S0260 H&P FOR SURGERY: HCPCS | Performed by: INTERNAL MEDICINE

## 2022-06-06 PROCEDURE — 25010000002 FENTANYL CITRATE (PF) 50 MCG/ML SOLUTION: Performed by: INTERNAL MEDICINE

## 2022-06-06 PROCEDURE — 93454 CORONARY ARTERY ANGIO S&I: CPT | Performed by: INTERNAL MEDICINE

## 2022-06-06 PROCEDURE — C1725 CATH, TRANSLUMIN NON-LASER: HCPCS | Performed by: INTERNAL MEDICINE

## 2022-06-06 PROCEDURE — C1887 CATHETER, GUIDING: HCPCS | Performed by: INTERNAL MEDICINE

## 2022-06-06 PROCEDURE — C1769 GUIDE WIRE: HCPCS | Performed by: INTERNAL MEDICINE

## 2022-06-06 PROCEDURE — C1874 STENT, COATED/COV W/DEL SYS: HCPCS | Performed by: INTERNAL MEDICINE

## 2022-06-06 PROCEDURE — 85347 COAGULATION TIME ACTIVATED: CPT

## 2022-06-06 PROCEDURE — 92978 ENDOLUMINL IVUS OCT C 1ST: CPT | Performed by: INTERNAL MEDICINE

## 2022-06-06 PROCEDURE — C1753 CATH, INTRAVAS ULTRASOUND: HCPCS | Performed by: INTERNAL MEDICINE

## 2022-06-06 PROCEDURE — 85027 COMPLETE CBC AUTOMATED: CPT | Performed by: HOSPITALIST

## 2022-06-06 PROCEDURE — C1894 INTRO/SHEATH, NON-LASER: HCPCS | Performed by: INTERNAL MEDICINE

## 2022-06-06 PROCEDURE — 0 LIDOCAINE 1 % SOLUTION: Performed by: INTERNAL MEDICINE

## 2022-06-06 PROCEDURE — 25010000002 PHENYLEPHRINE 10 MG/ML SOLUTION: Performed by: INTERNAL MEDICINE

## 2022-06-06 PROCEDURE — 80061 LIPID PANEL: CPT | Performed by: HOSPITALIST

## 2022-06-06 PROCEDURE — 92928 PRQ TCAT PLMT NTRAC ST 1 LES: CPT | Performed by: INTERNAL MEDICINE

## 2022-06-06 PROCEDURE — 83036 HEMOGLOBIN GLYCOSYLATED A1C: CPT | Performed by: HOSPITALIST

## 2022-06-06 DEVICE — XIENCE SKYPOINT™ EVEROLIMUS ELUTING CORONARY STENT SYSTEM 3.25 MM X 33 MM / RAPID-EXCHANGE
Type: IMPLANTABLE DEVICE | Status: FUNCTIONAL
Brand: XIENCE SKYPOINT™

## 2022-06-06 RX ORDER — ASPIRIN 81 MG/1
81 TABLET ORAL DAILY
Status: DISCONTINUED | OUTPATIENT
Start: 2022-06-07 | End: 2022-06-06 | Stop reason: HOSPADM

## 2022-06-06 RX ORDER — SODIUM CHLORIDE 0.9 % (FLUSH) 0.9 %
10 SYRINGE (ML) INJECTION EVERY 12 HOURS SCHEDULED
Status: DISCONTINUED | OUTPATIENT
Start: 2022-06-06 | End: 2022-06-06 | Stop reason: HOSPADM

## 2022-06-06 RX ORDER — HEPARIN SODIUM 1000 [USP'U]/ML
INJECTION, SOLUTION INTRAVENOUS; SUBCUTANEOUS AS NEEDED
Status: DISCONTINUED | OUTPATIENT
Start: 2022-06-06 | End: 2022-06-06 | Stop reason: HOSPADM

## 2022-06-06 RX ORDER — SODIUM CHLORIDE 9 MG/ML
3 INJECTION, SOLUTION INTRAVENOUS CONTINUOUS
Status: DISCONTINUED | OUTPATIENT
Start: 2022-06-06 | End: 2022-06-06 | Stop reason: HOSPADM

## 2022-06-06 RX ORDER — SODIUM CHLORIDE 9 MG/ML
3 INJECTION, SOLUTION INTRAVENOUS CONTINUOUS
Status: ACTIVE | OUTPATIENT
Start: 2022-06-06 | End: 2022-06-06

## 2022-06-06 RX ORDER — SODIUM CHLORIDE 0.9 % (FLUSH) 0.9 %
10 SYRINGE (ML) INJECTION AS NEEDED
Status: DISCONTINUED | OUTPATIENT
Start: 2022-06-06 | End: 2022-06-06 | Stop reason: HOSPADM

## 2022-06-06 RX ORDER — NITROGLYCERIN 0.4 MG/1
0.4 TABLET SUBLINGUAL
Qty: 25 TABLET | Refills: 5 | Status: SHIPPED | OUTPATIENT
Start: 2022-06-06

## 2022-06-06 RX ORDER — ASPIRIN 81 MG/1
324 TABLET, CHEWABLE ORAL ONCE
Status: COMPLETED | OUTPATIENT
Start: 2022-06-06 | End: 2022-06-06

## 2022-06-06 RX ORDER — FENTANYL CITRATE 50 UG/ML
INJECTION, SOLUTION INTRAMUSCULAR; INTRAVENOUS AS NEEDED
Status: DISCONTINUED | OUTPATIENT
Start: 2022-06-06 | End: 2022-06-06 | Stop reason: HOSPADM

## 2022-06-06 RX ORDER — ROSUVASTATIN CALCIUM 20 MG/1
20 TABLET, COATED ORAL DAILY
Qty: 90 TABLET | Refills: 3 | Status: SHIPPED | OUTPATIENT
Start: 2022-06-06

## 2022-06-06 RX ORDER — MIDAZOLAM HYDROCHLORIDE 1 MG/ML
INJECTION INTRAMUSCULAR; INTRAVENOUS AS NEEDED
Status: DISCONTINUED | OUTPATIENT
Start: 2022-06-06 | End: 2022-06-06 | Stop reason: HOSPADM

## 2022-06-06 RX ORDER — LIDOCAINE HYDROCHLORIDE 10 MG/ML
INJECTION, SOLUTION INFILTRATION; PERINEURAL AS NEEDED
Status: DISCONTINUED | OUTPATIENT
Start: 2022-06-06 | End: 2022-06-06 | Stop reason: HOSPADM

## 2022-06-06 RX ORDER — PHENYLEPHRINE HYDROCHLORIDE 10 MG/ML
INJECTION INTRAVENOUS AS NEEDED
Status: DISCONTINUED | OUTPATIENT
Start: 2022-06-06 | End: 2022-06-06 | Stop reason: HOSPADM

## 2022-06-06 RX ADMIN — SODIUM CHLORIDE 3 ML/KG/HR: 9 INJECTION, SOLUTION INTRAVENOUS at 11:01

## 2022-06-06 RX ADMIN — ASPIRIN 81 MG 324 MG: 81 TABLET ORAL at 11:03

## 2022-06-06 NOTE — H&P
[facility]  Cardiovascular History and Physical Note           Patient is a 52-year-old gentleman who underwent cardiac catheterization on 5/18/2022 for abnormal stress test and signs and symptoms concerning for unstable angina.  Coronary angiography showed severe three-vessel CAD.  CT angiogram of the chest showed he had a 4.4 cm ascending aortic aneurysm and he was initially referred to Dr. Quinton Rodriguez for his opinion.  Patient's ascending aortic aneurysm will continue to be monitored by CT surgery but he presents today to undergo staged PCI of his LAD.    Past medical and surgical history, social and family history reviewed in EMR.    REVIEW OF SYSTEMS:   H&P ROS reviewed and pertinent CV ROS as noted in HPI.         Vital Sign Min/Max for last 24 hours  Temp  Min: 97.3 °F (36.3 °C)  Max: 97.3 °F (36.3 °C)   BP  Min: 137/90  Max: 138/90   Pulse  Min: 98  Max: 98   Resp  Min: 18  Max: 18   SpO2  Min: 98 %  Max: 98 %   No data recorded    No intake or output data in the 24 hours ending 06/06/22 1056        Constitutional:       Appearance: Healthy appearance. Well-developed.   Eyes:      General: Lids are normal. No scleral icterus.     Conjunctiva/sclera: Conjunctivae normal.   HENT:      Head: Normocephalic and atraumatic.   Neck:      Thyroid: No thyromegaly.      Vascular: No carotid bruit or JVD.   Pulmonary:      Effort: Pulmonary effort is normal.      Breath sounds: Normal breath sounds. No wheezing. No rhonchi. No rales.   Cardiovascular:      Normal rate. Regular rhythm.      Murmurs: There is no murmur.      No gallop. No rub.   Pulses:     Intact distal pulses.   Edema:     Peripheral edema absent.   Abdominal:      General: There is no distension.      Palpations: Abdomen is soft. There is no abdominal mass.   Musculoskeletal:      Cervical back: Normal range of motion. Skin:     General: Skin is warm and dry.      Findings: No rash.   Neurological:      General:  No focal deficit present.      Mental Status: Alert and oriented to person, place, and time.      Gait: Gait is intact.   Psychiatric:         Attention and Perception: Attention normal.         Mood and Affect: Mood normal.         Behavior: Behavior normal.         Lab Review:   Labs reviewed in the electronic medical record.  Pertinent findings include:  Lab Results   Component Value Date    GLUCOSE 108 (H) 05/18/2022    BUN 14 05/18/2022    CREATININE 0.90 05/18/2022    EGFRIFNONA 88 09/27/2021    EGFRIFAFRI 107 09/27/2021    BCR 16.1 05/18/2022    K 4.1 05/18/2022    CO2 27.0 05/18/2022    CALCIUM 9.6 05/18/2022    PROTENTOTREF 7.0 09/27/2021    ALBUMIN 4.40 05/18/2022    LABIL2 2.3 09/27/2021    AST 25 05/18/2022    ALT 42 (H) 05/18/2022     Lab Results   Component Value Date    WBC 10.57 05/18/2022    HGB 15.4 05/18/2022    HCT 45.4 05/18/2022    MCV 88.8 05/18/2022     05/18/2022     Lab Results   Component Value Date    CHOL 161 05/18/2022    CHLPL 176 09/27/2021    TRIG 91 05/18/2022    HDL 38 (L) 05/18/2022     (H) 05/18/2022                Active Hospital Problems    Diagnosis    • **Coronary artery disease involving native coronary artery of native heart with angina pectoris (HCC)      · New onset anginal symptoms, 3/2022  · EKG (3/17/2020): Sinus rhythm.  Inferior infarct pattern with inferior T wave inversion  · No obvious coronary calcification on CT chest, 4/11/2022  · Exercise nuclear stress (4/27/2022): Inferior ischemia with development of ST elevation on EKG with exercise  · Cardiac catheterization (5/18/2022): Severe 2-vessel CAD (LAD, RPDA chronic total occlusion)     • Hyperlipidemia LDL goal <70      • High intensity statin therapy indicated given the presence of CAD     Patient presents today to undergo staged PCI of the left anterior descending artery.  The risks and benefits of the procedure were discussed and the patient is agreeable to proceed.  The patient faisal daily 81  mg aspirin and 75 mg Plavix of which he had his dose this morning.         · Lab results pending and if acceptable proceed with staged PCI of the LAD  · Further recommendations to follow    LARRY Chambers

## 2022-06-07 ENCOUNTER — CALL CENTER PROGRAMS (OUTPATIENT)
Dept: CALL CENTER | Facility: HOSPITAL | Age: 52
End: 2022-06-07

## 2022-06-07 ENCOUNTER — DOCUMENTATION (OUTPATIENT)
Dept: CARDIAC REHAB | Facility: HOSPITAL | Age: 52
End: 2022-06-07

## 2022-06-07 LAB — ACT BLD: 318 SECONDS (ref 82–152)

## 2022-06-07 NOTE — OUTREACH NOTE
PCI/Device Survey    Flowsheet Row Responses   Facility patient discharged from? [facility]   Procedure date 06/06/22   Procedure (if device, specify in description) PCI   PCI site Right, Arm  [radial ]   Performing MD Dr. Jairo Sawyer   Attempt successful? Yes   Call start time 0834   Call end time 0837   Person spoke with today (if not patient) and relationship Vicky-spouse    Has the patient had any of the following symptoms since discharge? --  [Pt informed Vicky he had some arm pain. Problem resolved. ]   Is the patient taking prescribed medications: ASA, Plavix   Nursing intervention Reminded to continue to take prescribed medications   Does the patient have any of the following symptoms related to the cath/surgical site? --  [None noted]   Does the patient have an appointment scheduled with the cardiologist? Yes   Appointment comments 7/12/22   If the patient is a current smoker, are they able to teach back resources for cessation? Not a smoker   Did the patient feel prepared to go home on the same day as the procedure? Yes   Is the patient satisfied with the same day discharge process? Yes   PCI/Device call completed Yes          LO ABBASI - Registered Nurse

## 2022-06-07 NOTE — PROGRESS NOTES
Referral received for Phase II Cardiac Rehab.  Staff reviewed chart and patient has qualifying diagnosis for Phase II Cardiac Rehab.  Staff sent referral to [facility] in regards to scheduling.

## 2022-07-08 NOTE — PROGRESS NOTES
[facility]      Identification: Eliazar Dan is a 52 y.o. male who resides in [place], [place].  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    Reason for visit:  Unstable angina       Subjective      Patient is a 52-year-old gentleman who returns today for follow-up of his coronary artery disease, aortic aneurysm and cardiac risk factors.  Patient had a CT of the chest in April that showed mild aneurysmal dilation of his thoracic aorta at 4.4 cm.  Last month he underwent staged PCI of his proximal/mid LAD.  The patient reports his anginal symptoms with shortness of breath and chest discomfort particularly when he would take the stairs at work.  He has not had any further exertional symptoms since PCI.  He has been having some neck discomfort that will radiate up his head.  It does not occur all the time and is never associated with exertional activities.  He does not have a history of migraines.  He did have a carotid duplex study that did not suggest any stenosis bilaterally.  He was evaluated by CT for his mild aneurysmal dilation of his thoracic aorta.  He has a follow-up appoint with him in a year for repeat CT scan for surveillance.  He is tolerating dual antiplatelet therapy without any bleeding issues.  He does report having some fatigue and is wondering if it could be a side effect of one of his medications.     Review of Systems   Constitutional: Positive for malaise/fatigue.   Eyes: Negative for vision loss in left eye and vision loss in right eye.   Cardiovascular: Negative for chest pain, dyspnea on exertion, near-syncope, orthopnea, palpitations, paroxysmal nocturnal dyspnea and syncope.   Musculoskeletal: Positive for neck pain. Negative for myalgias.   Neurological: Negative for brief paralysis, excessive daytime sleepiness, focal weakness, numbness, paresthesias and weakness.   All other systems reviewed and are negative.      Objective     /60 (BP Location: Left  "arm, Patient Position: Sitting)   Pulse 71   Ht 177.8 cm (70\")   Wt 80.6 kg (177 lb 9.6 oz)   SpO2 99%   BMI 25.48 kg/m²       Constitutional:       Appearance: Healthy appearance. Well-developed.   Eyes:      General: Lids are normal. No scleral icterus.     Conjunctiva/sclera: Conjunctivae normal.   HENT:      Head: Normocephalic and atraumatic.   Neck:      Thyroid: No thyromegaly.      Vascular: No carotid bruit or JVD.   Pulmonary:      Effort: Pulmonary effort is normal.      Breath sounds: Normal breath sounds. No wheezing. No rhonchi. No rales.   Cardiovascular:      Normal rate. Regular rhythm.      Murmurs: There is no murmur.      No gallop. No rub.   Pulses:     Intact distal pulses.   Edema:     Peripheral edema absent.   Abdominal:      General: There is no distension.      Palpations: Abdomen is soft. There is no abdominal mass.   Musculoskeletal:      Cervical back: Normal range of motion. Skin:     General: Skin is warm and dry.      Findings: No rash.   Neurological:      General: No focal deficit present.      Mental Status: Alert and oriented to person, place, and time.      Gait: Gait is intact.   Psychiatric:         Attention and Perception: Attention normal.         Mood and Affect: Mood normal.         Behavior: Behavior normal.         Result Review :    Lab Results   Component Value Date    GLUCOSE 110 (H) 06/06/2022    GLUCOSE 113 (H) 06/06/2022    BUN 11 06/06/2022    BUN 10 06/06/2022    CREATININE 0.80 06/06/2022    EGFRIFNONA 88 09/27/2021    EGFRIFAFRI 107 09/27/2021    BCR 11.5 06/06/2022    BCR 11.2 06/06/2022    K 4.0 06/06/2022    K 4.0 06/06/2022    CO2 29.0 06/06/2022    CO2 25.0 06/06/2022    CALCIUM 9.9 06/06/2022    CALCIUM 9.2 06/06/2022    PROTENTOTREF 7.0 09/27/2021    ALBUMIN 4.40 06/06/2022    LABIL2 2.3 09/27/2021    AST 23 06/06/2022    ALT 28 06/06/2022     Lab Results   Component Value Date    WBC 7.41 06/06/2022    HGB 15.9 06/06/2022    HCT 46.8 06/06/2022    " MCV 87.6 06/06/2022     06/06/2022     Lab Results   Component Value Date    CHOL 149 06/06/2022    CHLPL 176 09/27/2021    TRIG 81 06/06/2022    HDL 35 (L) 06/06/2022    LDL 98 06/06/2022     Lab Results   Component Value Date    HGBA1C 5.00 06/06/2022             Assessment     Problem List Items Addressed This Visit        Cardiac and Vasculature    Coronary artery disease involving native coronary artery of native heart with angina pectoris (HCC) - Primary    Overview     · New onset anginal symptoms, 3/2022  · EKG (3/17/2020): Sinus rhythm.  Inferior infarct pattern with inferior T wave inversion  · No obvious coronary calcification on CT chest, 4/11/2022  · Exercise nuclear stress (4/27/2022): Inferior ischemia with development of ST elevation on EKG with exercise  · Cardiac catheterization (5/18/2022): Severe 2-vessel CAD (LAD, RPDA chronic total occlusion)  · Staged PCI of proximal/mid LAD using Xience 3.25 x 33 mm KEON, 6/6/2022           Current Assessment & Plan     · No signs or symptoms of angina  · Continue dual antiplatelet therapy with aspirin and Plavix  · Continue metoprolol succinate 25 mg daily  · Sublingual nitroglycerin as needed for any episodes of angina           Hyperlipidemia LDL goal <70 (Chronic)    Overview     • High intensity statin therapy indicated given the presence of CAD           Current Assessment & Plan     · Continue Crestor 20 mg daily  · Will need repeat lipid panel and CMP           Relevant Orders    Comprehensive Metabolic Panel    Thoracic aortic aneurysm without rupture (HCC)    Overview     · CT chest (4/11/2022): Mild aneurysmal dilation of thoracic aorta (4.4 cm).           Current Assessment & Plan     · Mild aneurysmal dilation.  No commendations at this time               Patient is doing well from a cardiovascular standpoint.  His anginal symptoms resolved after PCI.  His neck discomfort into his head does not sound to be cardiac related.  If this  continues I recommend follow-up with PCP.  May benefit from a CT of the head or referral to neurology for a neuralgia or possible migraine.  We will continue his current medications.  He will need a CMP and lipid profile next month.  Patient should take his metoprolol in the evening to see if helps his daytime fatigue.  If this does not help we can try decreasing the metoprolol.  He will contact us for update.  Follow-up 6 months or sooner if needed.  Plan   • Continue current medications  • Follow-up with PCP regarding neck and head discomfort.  Low suspicion it would be cardiac related at his visit is not associate with any exertional activity and is nothing like the anginal symptoms he experienced prior to PCI.  May benefit from CT of the head and/or referral to neurology.  • Continue dual antiplatelet therapy  • Try taking metoprolol succinate in the evening at bedtime.  If fatigue symptoms do not resolve we will plan on decreasing metoprolol to 12.5 mg daily      Follow-up   Return in about 6 months (around 1/12/2023), or if symptoms worsen or fail to improve, for Follow-up with Dr. Sawyer next visit.      Jody Caldera, LARRY  7/12/2022

## 2022-07-12 ENCOUNTER — OFFICE VISIT (OUTPATIENT)
Dept: CARDIOLOGY | Facility: CLINIC | Age: 52
End: 2022-07-12

## 2022-07-12 VITALS
OXYGEN SATURATION: 99 % | DIASTOLIC BLOOD PRESSURE: 60 MMHG | HEIGHT: 70 IN | SYSTOLIC BLOOD PRESSURE: 120 MMHG | WEIGHT: 177.6 LBS | HEART RATE: 71 BPM | BODY MASS INDEX: 25.43 KG/M2

## 2022-07-12 DIAGNOSIS — I71.20 THORACIC AORTIC ANEURYSM WITHOUT RUPTURE: ICD-10-CM

## 2022-07-12 DIAGNOSIS — I25.119 CORONARY ARTERY DISEASE INVOLVING NATIVE CORONARY ARTERY OF NATIVE HEART WITH ANGINA PECTORIS: Primary | ICD-10-CM

## 2022-07-12 DIAGNOSIS — E78.5 HYPERLIPIDEMIA LDL GOAL <70: Chronic | ICD-10-CM

## 2022-07-12 PROCEDURE — 99214 OFFICE O/P EST MOD 30 MIN: CPT | Performed by: NURSE PRACTITIONER

## 2022-07-12 NOTE — ASSESSMENT & PLAN NOTE
· No signs or symptoms of angina  · Continue dual antiplatelet therapy with aspirin and Plavix  · Continue metoprolol succinate 25 mg daily  · Sublingual nitroglycerin as needed for any episodes of angina

## 2022-08-19 ENCOUNTER — LAB (OUTPATIENT)
Dept: LAB | Facility: HOSPITAL | Age: 52
End: 2022-08-19

## 2022-08-19 DIAGNOSIS — E78.5 HYPERLIPIDEMIA, UNSPECIFIED HYPERLIPIDEMIA TYPE: ICD-10-CM

## 2022-08-19 DIAGNOSIS — E78.5 HYPERLIPIDEMIA LDL GOAL <70: Chronic | ICD-10-CM

## 2022-08-19 LAB
ALBUMIN SERPL-MCNC: 4.6 G/DL (ref 3.5–5.2)
ALBUMIN/GLOB SERPL: 1.8 G/DL
ALP SERPL-CCNC: 74 U/L (ref 39–117)
ALT SERPL W P-5'-P-CCNC: 29 U/L (ref 1–41)
ANION GAP SERPL CALCULATED.3IONS-SCNC: 11 MMOL/L (ref 5–15)
AST SERPL-CCNC: 26 U/L (ref 1–40)
BILIRUB SERPL-MCNC: 0.5 MG/DL (ref 0–1.2)
BUN SERPL-MCNC: 10 MG/DL (ref 6–20)
BUN/CREAT SERPL: 10.1 (ref 7–25)
CALCIUM SPEC-SCNC: 9.5 MG/DL (ref 8.6–10.5)
CHLORIDE SERPL-SCNC: 106 MMOL/L (ref 98–107)
CHOLEST SERPL-MCNC: 134 MG/DL (ref 0–200)
CO2 SERPL-SCNC: 28 MMOL/L (ref 22–29)
CREAT SERPL-MCNC: 0.99 MG/DL (ref 0.76–1.27)
EGFRCR SERPLBLD CKD-EPI 2021: 90.5 ML/MIN/1.73
GLOBULIN UR ELPH-MCNC: 2.6 GM/DL
GLUCOSE SERPL-MCNC: 92 MG/DL (ref 65–99)
HDLC SERPL-MCNC: 35 MG/DL (ref 40–60)
LDLC SERPL CALC-MCNC: 82 MG/DL (ref 0–100)
LDLC/HDLC SERPL: 2.32 {RATIO}
POTASSIUM SERPL-SCNC: 3.7 MMOL/L (ref 3.5–5.2)
PROT SERPL-MCNC: 7.2 G/DL (ref 6–8.5)
SODIUM SERPL-SCNC: 145 MMOL/L (ref 136–145)
TRIGL SERPL-MCNC: 89 MG/DL (ref 0–150)
VLDLC SERPL-MCNC: 17 MG/DL (ref 5–40)

## 2022-08-19 PROCEDURE — 80061 LIPID PANEL: CPT

## 2022-08-19 PROCEDURE — 80053 COMPREHEN METABOLIC PANEL: CPT

## 2022-08-19 PROCEDURE — 36415 COLL VENOUS BLD VENIPUNCTURE: CPT

## 2022-08-23 ENCOUNTER — TELEPHONE (OUTPATIENT)
Dept: CARDIOLOGY | Facility: CLINIC | Age: 52
End: 2022-08-23

## 2022-08-23 RX ORDER — EZETIMIBE 10 MG/1
10 TABLET ORAL DAILY
Qty: 90 TABLET | Refills: 3 | Status: SHIPPED | OUTPATIENT
Start: 2022-08-23

## 2022-08-23 NOTE — TELEPHONE ENCOUNTER
----- Message from Jairo Sawyer IV, MD sent at 8/22/2022  3:03 PM EDT -----  Your cholesterol is improving but still higher than I would like it to be.  I would like you to try ezetimibe 10 mg daily.  This should lower your bad cholesterol about 20%.  Alternatively, you can take a twice a month shot which will dramatically lower your bad cholesterol (LDL).  If you rather go this route, then we will need to do some work with insurance to get this covered.  In the meantime, we can try acetamide.

## 2022-09-26 ENCOUNTER — OFFICE VISIT (OUTPATIENT)
Dept: INTERNAL MEDICINE | Facility: CLINIC | Age: 52
End: 2022-09-26

## 2022-09-26 VITALS
OXYGEN SATURATION: 98 % | HEART RATE: 83 BPM | BODY MASS INDEX: 24.91 KG/M2 | TEMPERATURE: 97.5 F | DIASTOLIC BLOOD PRESSURE: 70 MMHG | HEIGHT: 70 IN | SYSTOLIC BLOOD PRESSURE: 120 MMHG | WEIGHT: 174 LBS

## 2022-09-26 DIAGNOSIS — E78.5 HYPERLIPIDEMIA LDL GOAL <70: Chronic | ICD-10-CM

## 2022-09-26 DIAGNOSIS — N40.1 BENIGN NON-NODULAR PROSTATIC HYPERPLASIA WITH LOWER URINARY TRACT SYMPTOMS: ICD-10-CM

## 2022-09-26 DIAGNOSIS — Z00.00 ANNUAL PHYSICAL EXAM: Primary | ICD-10-CM

## 2022-09-26 DIAGNOSIS — I71.20 THORACIC AORTIC ANEURYSM WITHOUT RUPTURE: ICD-10-CM

## 2022-09-26 DIAGNOSIS — E55.9 VITAMIN D DEFICIENCY DISEASE: ICD-10-CM

## 2022-09-26 DIAGNOSIS — I25.119 CORONARY ARTERY DISEASE INVOLVING NATIVE CORONARY ARTERY OF NATIVE HEART WITH ANGINA PECTORIS: ICD-10-CM

## 2022-09-26 PROCEDURE — 99396 PREV VISIT EST AGE 40-64: CPT | Performed by: INTERNAL MEDICINE

## 2022-09-26 NOTE — PROGRESS NOTES
Subjective   Eliazar Dan is a 52 y.o. male and is here for a comprehensive physical exam.     Do you take any herbs or supplements that were not prescribed by a doctor? no  Are you taking calcium supplements? no  Are you taking aspirin daily? no      The following portions of the patient's history were reviewed and updated as appropriate: allergies, current medications, past family history, past medical history, past social history, past surgical history and problem list.      Review of Systems   Constitutional: Negative.    HENT: Negative.    Eyes: Negative.    Respiratory: Negative.    Cardiovascular: Negative.    Gastrointestinal: Negative.    Endocrine: Negative.    Genitourinary: Negative.    Musculoskeletal: Negative.    Skin: Negative.    Allergic/Immunologic: Negative.    Neurological: Negative.    Hematological: Negative.    Psychiatric/Behavioral: Negative.    All other systems reviewed and are negative.        Physical Exam  Vitals and nursing note reviewed.   Constitutional:       Appearance: Normal appearance. He is well-developed and normal weight.   HENT:      Head: Normocephalic and atraumatic.      Right Ear: Tympanic membrane, ear canal and external ear normal.      Left Ear: Tympanic membrane, ear canal and external ear normal.      Nose: Nose normal.      Mouth/Throat:      Mouth: Mucous membranes are moist.      Pharynx: Oropharynx is clear.   Eyes:      Conjunctiva/sclera: Conjunctivae normal.      Pupils: Pupils are equal, round, and reactive to light.   Neck:      Thyroid: No thyromegaly.   Cardiovascular:      Rate and Rhythm: Normal rate and regular rhythm.      Heart sounds: Normal heart sounds.   Pulmonary:      Effort: Pulmonary effort is normal.      Breath sounds: Normal breath sounds.   Abdominal:      General: Bowel sounds are normal.      Palpations: Abdomen is soft.   Genitourinary:     Penis: Normal.       Prostate: Normal.      Rectum: Normal.   Musculoskeletal:          General: Normal range of motion.      Cervical back: Normal range of motion and neck supple.   Skin:     General: Skin is warm and dry.   Neurological:      General: No focal deficit present.      Mental Status: He is alert and oriented to person, place, and time. Mental status is at baseline.      Deep Tendon Reflexes: Reflexes are normal and symmetric.   Psychiatric:         Mood and Affect: Mood normal.         Behavior: Behavior normal.         Thought Content: Thought content normal.         Judgment: Judgment normal.         All  tests have been reviewed.    Assessment & Plan          1. Patient Counseling:  --Nutrition: Stressed importance of moderation in sodium/caffeine intake, saturated fat and cholesterol, caloric balance, sufficient intake of fresh fruits, vegetables, fiber, calcium and iron.  --Exercise: Stressed the importance of regular exercise.   --Injury prevention: Discussed safety belts, safety helmets, smoke detector, smoking near bedding or upholstery.   --Dental health: Discussed importance of regular tooth brushing, flossing, and dental visits.  --Immunizations reviewed.  --Discussed benefits of screening colonoscopy.  --After hours service discussed with patient    2. Discussed the patient's BMI with him.            Hyperlipidemia do lab   Aneurysm follow up cardio   Right epididymal cyst small 1 cm continue to watch  Skin tags continue watch   Colonoscopy refused again 9/26/22, and s/p  cologuard  bph 3+ WATCH NOCTURIA X 1 follow-up with urology  PSA elevation seen by uro, bx negative, follow up with uro  Vit D low on med   shingrix informed  6 mo

## 2022-11-11 DIAGNOSIS — I25.119 CORONARY ARTERY DISEASE INVOLVING NATIVE CORONARY ARTERY OF NATIVE HEART WITH ANGINA PECTORIS: ICD-10-CM

## 2022-11-14 RX ORDER — CLOPIDOGREL BISULFATE 75 MG/1
TABLET ORAL
Qty: 90 TABLET | Refills: 3 | Status: SHIPPED | OUTPATIENT
Start: 2022-11-14

## 2023-02-26 DIAGNOSIS — I20.0 UNSTABLE ANGINA: ICD-10-CM

## 2023-02-27 RX ORDER — METOPROLOL SUCCINATE 25 MG/1
TABLET, EXTENDED RELEASE ORAL
Qty: 90 TABLET | Refills: 2 | Status: SHIPPED | OUTPATIENT
Start: 2023-02-27

## 2023-03-21 DIAGNOSIS — I71.21 ANEURYSM OF ASCENDING AORTA WITHOUT RUPTURE: Primary | ICD-10-CM

## 2023-03-27 ENCOUNTER — OFFICE VISIT (OUTPATIENT)
Dept: INTERNAL MEDICINE | Facility: CLINIC | Age: 53
End: 2023-03-27
Payer: COMMERCIAL

## 2023-03-27 VITALS
WEIGHT: 173 LBS | DIASTOLIC BLOOD PRESSURE: 82 MMHG | OXYGEN SATURATION: 99 % | SYSTOLIC BLOOD PRESSURE: 130 MMHG | HEIGHT: 69 IN | TEMPERATURE: 97.2 F | HEART RATE: 69 BPM | BODY MASS INDEX: 25.62 KG/M2

## 2023-03-27 DIAGNOSIS — G44.209 TENSION HEADACHE: ICD-10-CM

## 2023-03-27 DIAGNOSIS — I71.20 THORACIC AORTIC ANEURYSM WITHOUT RUPTURE, UNSPECIFIED PART: Primary | ICD-10-CM

## 2023-03-27 DIAGNOSIS — E78.5 HYPERLIPIDEMIA LDL GOAL <70: Chronic | ICD-10-CM

## 2023-03-27 PROCEDURE — 99214 OFFICE O/P EST MOD 30 MIN: CPT | Performed by: INTERNAL MEDICINE

## 2023-03-27 RX ORDER — CYCLOBENZAPRINE HCL 5 MG
10 TABLET ORAL NIGHTLY PRN
Qty: 30 TABLET | Refills: 1 | Status: SHIPPED | OUTPATIENT
Start: 2023-03-27

## 2023-03-27 NOTE — PROGRESS NOTES
Subjective   Eliazar Dan is a 53 y.o. male.     Chief Complaint   Patient presents with   • Follow-up   • Hypertension   • Hyperlipidemia       History of Present Illness  Pt presents for 6mo followup for HTN, HPL, CAD s/p cath and stenting. Pt did have COVID-19 in November 2022 but recovered quickly and fully. He states that he is having unilateral left-sided headache with subsequent fatigue 2-3x monthly for the past 8 months without any noticeable pattern.  Hypertension  Associated symptoms include headaches.   Hyperlipidemia    Headache mainly from the occipital area on the left side pain last about 2 to 3 hours without photophobia without blurry vision Tylenol helps.      Current Outpatient Medications:   •  aspirin (aspirin) 81 MG EC tablet, Take 1 tablet by mouth Daily., Disp: 90 tablet, Rfl: 3  •  clopidogrel (PLAVIX) 75 MG tablet, TAKE 1 TABLET BY MOUTH EVERY DAY, Disp: 90 tablet, Rfl: 3  •  ezetimibe (ZETIA) 10 MG tablet, Take 1 tablet by mouth Daily., Disp: 90 tablet, Rfl: 3  •  metoprolol succinate XL (TOPROL-XL) 25 MG 24 hr tablet, TAKE 1 TABLET BY MOUTH EVERY DAY, Disp: 90 tablet, Rfl: 2  •  nitroglycerin (NITROSTAT) 0.4 MG SL tablet, Place 1 tablet under the tongue Every 5 (Five) Minutes As Needed for Chest Pain., Disp: 25 tablet, Rfl: 5  •  rosuvastatin (CRESTOR) 20 MG tablet, Take 1 tablet by mouth Daily., Disp: 90 tablet, Rfl: 3    The following portions of the patient's history were reviewed and updated as appropriate: allergies, current medications, past family history, past medical history, past social history, past surgical history and problem list.    Review of Systems   Respiratory: Negative.    Cardiovascular: Negative.    Gastrointestinal: Negative.    Skin: Negative.    Neurological: Positive for headaches.   Psychiatric/Behavioral: Negative.        Objective   Physical Exam  Constitutional:       Appearance: He is well-developed.   Cardiovascular:      Rate and Rhythm: Normal rate  and regular rhythm.      Heart sounds: Normal heart sounds.   Pulmonary:      Effort: Pulmonary effort is normal.      Breath sounds: Normal breath sounds.   Abdominal:      General: Bowel sounds are normal.      Palpations: Abdomen is soft.   Musculoskeletal:         General: No tenderness.      Cervical back: Neck supple.   Neurological:      Mental Status: He is alert and oriented to person, place, and time.   Psychiatric:         Behavior: Behavior normal.         All tests have been reviewed.    Assessment & Plan   Diagnoses and all orders for this visit:    Thoracic aortic aneurysm without rupture, unspecified part (HCC) CT surgery following    Hyperlipidemia LDL goal <70 Stable on medication    Hypertension Controlled on metoprolol 25mg     Tension headache initiate Flexeril.  Also asked patient to watch posture.    6 months for annual physical       Below is to historical records for reference only:   hyperlipidemia do lab   Aneurysm follow up cardio   Right epididymal cyst small 1 cm continue to watch  Skin tags continue watch   Colonoscopy refused again 9/26/22, and s/p  cologuard  bph 3+ WATCH NOCTURIA X 1 follow-up with urology  PSA elevation seen by uro, bx negative, follow up with uro  Vit D low on med   shingrix informed

## 2023-04-13 DIAGNOSIS — I20.0 UNSTABLE ANGINA: ICD-10-CM

## 2023-04-14 RX ORDER — ASPIRIN 81 MG/1
TABLET, COATED ORAL
Qty: 90 TABLET | Refills: 3 | Status: SHIPPED | OUTPATIENT
Start: 2023-04-14

## 2023-05-05 ENCOUNTER — HOSPITAL ENCOUNTER (OUTPATIENT)
Dept: CT IMAGING | Facility: HOSPITAL | Age: 53
Discharge: HOME | End: 2023-05-05
Payer: COMMERCIAL

## 2023-05-05 DIAGNOSIS — I71.21 ANEURYSM OF ASCENDING AORTA WITHOUT RUPTURE: ICD-10-CM

## 2023-05-05 PROCEDURE — 25510000001 IOPAMIDOL 61 % SOLUTION: Performed by: NURSE PRACTITIONER

## 2023-05-05 PROCEDURE — 71270 CT THORAX DX C-/C+: CPT

## 2023-05-05 RX ADMIN — IOPAMIDOL 100 ML: 612 INJECTION, SOLUTION INTRAVENOUS at 16:44

## 2023-05-12 ENCOUNTER — LAB (OUTPATIENT)
Dept: LAB | Facility: HOSPITAL | Age: 53
End: 2023-05-12
Payer: COMMERCIAL

## 2023-05-12 LAB
25(OH)D3 SERPL-MCNC: 18.6 NG/ML (ref 30–100)
ALBUMIN SERPL-MCNC: 4.4 G/DL (ref 3.5–5.2)
ALBUMIN/GLOB SERPL: 1.8 G/DL
ALP SERPL-CCNC: 72 U/L (ref 39–117)
ALT SERPL W P-5'-P-CCNC: 42 U/L (ref 1–41)
ANION GAP SERPL CALCULATED.3IONS-SCNC: 8 MMOL/L (ref 5–15)
AST SERPL-CCNC: 31 U/L (ref 1–40)
BASOPHILS # BLD AUTO: 0.05 10*3/MM3 (ref 0–0.2)
BASOPHILS NFR BLD AUTO: 0.7 % (ref 0–1.5)
BILIRUB SERPL-MCNC: 0.4 MG/DL (ref 0–1.2)
BUN SERPL-MCNC: 11 MG/DL (ref 6–20)
BUN/CREAT SERPL: 12.2 (ref 7–25)
CALCIUM SPEC-SCNC: 9.2 MG/DL (ref 8.6–10.5)
CHLORIDE SERPL-SCNC: 107 MMOL/L (ref 98–107)
CHOLEST SERPL-MCNC: 120 MG/DL (ref 0–200)
CK SERPL-CCNC: 156 U/L (ref 20–200)
CO2 SERPL-SCNC: 29 MMOL/L (ref 22–29)
CREAT SERPL-MCNC: 0.9 MG/DL (ref 0.76–1.27)
DEPRECATED RDW RBC AUTO: 37.4 FL (ref 37–54)
EGFRCR SERPLBLD CKD-EPI 2021: 100.9 ML/MIN/1.73
EOSINOPHIL # BLD AUTO: 0.08 10*3/MM3 (ref 0–0.4)
EOSINOPHIL NFR BLD AUTO: 1.1 % (ref 0.3–6.2)
ERYTHROCYTE [DISTWIDTH] IN BLOOD BY AUTOMATED COUNT: 11.8 % (ref 12.3–15.4)
GLOBULIN UR ELPH-MCNC: 2.5 GM/DL
GLUCOSE SERPL-MCNC: 96 MG/DL (ref 65–99)
HCT VFR BLD AUTO: 45.5 % (ref 37.5–51)
HDLC SERPL-MCNC: 41 MG/DL (ref 40–60)
HGB BLD-MCNC: 15.9 G/DL (ref 13–17.7)
IMM GRANULOCYTES # BLD AUTO: 0.02 10*3/MM3 (ref 0–0.05)
IMM GRANULOCYTES NFR BLD AUTO: 0.3 % (ref 0–0.5)
LDLC SERPL CALC-MCNC: 62 MG/DL (ref 0–100)
LDLC/HDLC SERPL: 1.52 {RATIO}
LYMPHOCYTES # BLD AUTO: 1.91 10*3/MM3 (ref 0.7–3.1)
LYMPHOCYTES NFR BLD AUTO: 25.2 % (ref 19.6–45.3)
MCH RBC QN AUTO: 30.3 PG (ref 26.6–33)
MCHC RBC AUTO-ENTMCNC: 34.9 G/DL (ref 31.5–35.7)
MCV RBC AUTO: 86.7 FL (ref 79–97)
MONOCYTES # BLD AUTO: 0.64 10*3/MM3 (ref 0.1–0.9)
MONOCYTES NFR BLD AUTO: 8.4 % (ref 5–12)
NEUTROPHILS NFR BLD AUTO: 4.88 10*3/MM3 (ref 1.7–7)
NEUTROPHILS NFR BLD AUTO: 64.3 % (ref 42.7–76)
NRBC BLD AUTO-RTO: 0 /100 WBC (ref 0–0.2)
PLATELET # BLD AUTO: 200 10*3/MM3 (ref 140–450)
PMV BLD AUTO: 9.9 FL (ref 6–12)
POTASSIUM SERPL-SCNC: 3.8 MMOL/L (ref 3.5–5.2)
PROT SERPL-MCNC: 6.9 G/DL (ref 6–8.5)
RBC # BLD AUTO: 5.25 10*6/MM3 (ref 4.14–5.8)
SODIUM SERPL-SCNC: 144 MMOL/L (ref 136–145)
TRIGL SERPL-MCNC: 84 MG/DL (ref 0–150)
TSH SERPL DL<=0.05 MIU/L-ACNC: 1.72 UIU/ML (ref 0.27–4.2)
VLDLC SERPL-MCNC: 17 MG/DL (ref 5–40)
WBC NRBC COR # BLD: 7.58 10*3/MM3 (ref 3.4–10.8)

## 2023-05-12 PROCEDURE — 82550 ASSAY OF CK (CPK): CPT | Performed by: INTERNAL MEDICINE

## 2023-05-12 PROCEDURE — 80061 LIPID PANEL: CPT | Performed by: INTERNAL MEDICINE

## 2023-05-12 PROCEDURE — 80050 GENERAL HEALTH PANEL: CPT | Performed by: INTERNAL MEDICINE

## 2023-05-12 PROCEDURE — 82306 VITAMIN D 25 HYDROXY: CPT | Performed by: INTERNAL MEDICINE

## 2023-05-15 NOTE — PROGRESS NOTES
"    Cardiology Outpatient Visit      Identification: Eliazar Dan is a 53 y.o. male who resides in [place], [place].     Reason for visit:  CAD      Subjective      Patient returns the office today for follow-up.  He is doing well and denies anginal symptoms.  He is physically active without symptoms.  He wonders whether he can come off of one of his antiplatelet medicines due to bruising.    He was evaluated by CT surgery in March with CT chest to look at his thoracic ectasia.  Compared to last year, his ascending aorta increased from 4.4 cm to 4.5 cm.    Patient states that his feet appear red in the morning when he gets out of bed.  No pain, swelling.    Review of Systems   Cardiovascular:  Negative for chest pain, claudication, cyanosis, dyspnea on exertion, irregular heartbeat, leg swelling, near-syncope, orthopnea, palpitations, paroxysmal nocturnal dyspnea and syncope.   Respiratory:  Negative for cough, hemoptysis, shortness of breath, sleep disturbances due to breathing, snoring, sputum production and wheezing.      No Known Allergies      Current Outpatient Medications   Medication Instructions    aspirin (ASPIRIN LOW DOSE) 81 mg, Oral, Daily    cyclobenzaprine (FLEXERIL) 10 mg, Oral, Nightly PRN    ezetimibe (ZETIA) 10 mg, Oral, Daily    metoprolol succinate XL (TOPROL-XL) 25 mg, Oral, Daily    nitroglycerin (NITROSTAT) 0.4 mg, Sublingual, Every 5 Minutes PRN    rosuvastatin (CRESTOR) 20 mg, Oral, Daily         Objective     /82 (BP Location: Left arm, Patient Position: Sitting)   Pulse 81   Ht 175.3 cm (69\")   Wt 80.7 kg (178 lb)   SpO2 98%   BMI 26.29 kg/m²       Constitutional:       Appearance: Healthy appearance.   Eyes:      General: No scleral icterus.  Neck:      Thyroid: No thyroid mass.      Vascular: No carotid bruit or JVD. JVD normal.   Pulmonary:      Effort: Pulmonary effort is normal.      Breath sounds: Normal breath sounds.   Cardiovascular:      Normal rate. Regular rhythm. "      Murmurs: There is no murmur.      No gallop.    Pulses:     Dorsalis pedis: 2+ bilaterally.     Posterior tibial: 2+ bilaterally.  Edema:     Peripheral edema absent.   Skin:     General: Skin is warm. There is no cyanosis.   Neurological:      General: No focal deficit present.      Mental Status: Alert.   Psychiatric:         Attention and Perception: Attention normal.       Result Review  (reviewed with patient):            Lab Results   Component Value Date    GLUCOSE 96 05/12/2023    BUN 11 05/12/2023    CREATININE 0.90 05/12/2023    EGFR 100.9 05/12/2023    BCR 12.2 05/12/2023    K 3.8 05/12/2023    CO2 29.0 05/12/2023    CALCIUM 9.2 05/12/2023    PROTENTOTREF 7.0 09/27/2021    ALBUMIN 4.4 05/12/2023    BILITOT 0.4 05/12/2023    AST 31 05/12/2023    ALT 42 (H) 05/12/2023     Lab Results   Component Value Date    WBC 7.58 05/12/2023    HGB 15.9 05/12/2023    HCT 45.5 05/12/2023    MCV 86.7 05/12/2023     05/12/2023     Lab Results   Component Value Date    CHOL 120 05/12/2023    CHLPL 176 09/27/2021    TRIG 84 05/12/2023    HDL 41 05/12/2023    LDL 62 05/12/2023     Lab Results   Component Value Date    HGBA1C 5.00 06/06/2022           Assessment     Diagnoses and all orders for this visit:    1. Coronary artery disease involving native coronary artery of native heart with angina pectoris (Primary)  Overview:  New onset anginal symptoms, 3/2022  EKG (3/17/2020): Sinus rhythm.  Inferior infarct pattern with inferior T wave inversion  No obvious coronary calcification on CT chest, 4/11/2022  Exercise nuclear stress (4/27/2022): Inferior ischemia with development of ST elevation on EKG with exercise  Cardiac catheterization (5/18/2022): Severe 2-vessel CAD (LAD, RPDA chronic total occlusion)  Staged PCI of proximal/mid LAD using Xience 3.25 x 33 mm KEON, 6/6/2022    Assessment & Plan:  No angina  Continue aspirin.  Make discontinue clopidogrel  Continue beta-blocker and statin    Orders:  -     aspirin  (Aspirin Low Dose) 81 MG EC tablet; Take 1 tablet by mouth Daily.  Dispense: 90 tablet; Refill: 3  -     metoprolol succinate XL (TOPROL-XL) 25 MG 24 hr tablet; Take 1 tablet by mouth Daily.  Dispense: 90 tablet; Refill: 3  -     PHARMACOGENOMICS PROFILE,BIOTAP - Swab,; Future  -     nitroglycerin (NITROSTAT) 0.4 MG SL tablet; Place 1 tablet under the tongue Every 5 (Five) Minutes As Needed for Chest Pain.  Dispense: 25 tablet; Refill: 5  -     Discontinue: clopidogrel (PLAVIX) 75 MG tablet; Take 1 tablet by mouth Daily.  Dispense: 90 tablet; Refill: 3    2. Hyperlipidemia LDL goal <70  Overview:  High intensity statin therapy indicated given the presence of CAD    Assessment & Plan:  Controlled  Continue rosuvastatin and ezetimibe    Orders:  -     rosuvastatin (CRESTOR) 20 MG tablet; Take 1 tablet by mouth Daily.  Dispense: 90 tablet; Refill: 3  -     ezetimibe (ZETIA) 10 MG tablet; Take 1 tablet by mouth Daily.  Dispense: 90 tablet; Refill: 3          Plan   Discontinue clopidogrel  Otherwise continue present medical therapy      Follow-up   Return in about 1 year (around 5/16/2024) for Follow-up with cardiology APRN/PA.        Jalil Sawyer MD, FACC, [facility]  5/16/2023

## 2023-05-16 ENCOUNTER — OFFICE VISIT (OUTPATIENT)
Dept: CARDIAC SURGERY | Facility: CLINIC | Age: 53
End: 2023-05-16
Payer: COMMERCIAL

## 2023-05-16 ENCOUNTER — OFFICE VISIT (OUTPATIENT)
Dept: CARDIOLOGY | Facility: CLINIC | Age: 53
End: 2023-05-16
Payer: COMMERCIAL

## 2023-05-16 VITALS
BODY MASS INDEX: 25.92 KG/M2 | TEMPERATURE: 97.5 F | WEIGHT: 175 LBS | HEART RATE: 75 BPM | DIASTOLIC BLOOD PRESSURE: 89 MMHG | HEIGHT: 69 IN | SYSTOLIC BLOOD PRESSURE: 133 MMHG | OXYGEN SATURATION: 98 %

## 2023-05-16 VITALS
BODY MASS INDEX: 26.36 KG/M2 | DIASTOLIC BLOOD PRESSURE: 82 MMHG | HEIGHT: 69 IN | OXYGEN SATURATION: 98 % | WEIGHT: 178 LBS | SYSTOLIC BLOOD PRESSURE: 118 MMHG | HEART RATE: 81 BPM

## 2023-05-16 DIAGNOSIS — I71.21 ANEURYSM OF ASCENDING AORTA WITHOUT RUPTURE: Primary | ICD-10-CM

## 2023-05-16 DIAGNOSIS — E78.5 HYPERLIPIDEMIA LDL GOAL <70: Chronic | ICD-10-CM

## 2023-05-16 DIAGNOSIS — I25.119 CORONARY ARTERY DISEASE INVOLVING NATIVE CORONARY ARTERY OF NATIVE HEART WITH ANGINA PECTORIS: Primary | ICD-10-CM

## 2023-05-16 DIAGNOSIS — I25.10 CORONARY ARTERY DISEASE INVOLVING NATIVE CORONARY ARTERY OF NATIVE HEART WITHOUT ANGINA PECTORIS: ICD-10-CM

## 2023-05-16 DIAGNOSIS — I10 PRIMARY HYPERTENSION: ICD-10-CM

## 2023-05-16 PROCEDURE — 99214 OFFICE O/P EST MOD 30 MIN: CPT | Performed by: INTERNAL MEDICINE

## 2023-05-16 PROCEDURE — 99214 OFFICE O/P EST MOD 30 MIN: CPT | Performed by: NURSE PRACTITIONER

## 2023-05-16 RX ORDER — ASPIRIN 81 MG/1
81 TABLET ORAL DAILY
Qty: 90 TABLET | Refills: 3 | Status: SHIPPED | OUTPATIENT
Start: 2023-05-16

## 2023-05-16 RX ORDER — EZETIMIBE 10 MG/1
10 TABLET ORAL DAILY
Qty: 90 TABLET | Refills: 3 | Status: SHIPPED | OUTPATIENT
Start: 2023-05-16

## 2023-05-16 RX ORDER — CLOPIDOGREL BISULFATE 75 MG/1
75 TABLET ORAL DAILY
Qty: 90 TABLET | Refills: 3 | Status: SHIPPED | OUTPATIENT
Start: 2023-05-16 | End: 2023-05-16

## 2023-05-16 RX ORDER — METOPROLOL SUCCINATE 25 MG/1
25 TABLET, EXTENDED RELEASE ORAL DAILY
Qty: 90 TABLET | Refills: 3 | Status: SHIPPED | OUTPATIENT
Start: 2023-05-16

## 2023-05-16 RX ORDER — NITROGLYCERIN 0.4 MG/1
0.4 TABLET SUBLINGUAL
Qty: 25 TABLET | Refills: 5 | Status: SHIPPED | OUTPATIENT
Start: 2023-05-16

## 2023-05-16 RX ORDER — ROSUVASTATIN CALCIUM 20 MG/1
20 TABLET, COATED ORAL DAILY
Qty: 90 TABLET | Refills: 3 | Status: SHIPPED | OUTPATIENT
Start: 2023-05-16

## 2023-05-16 NOTE — PROGRESS NOTES
[facility] Cardiothoracic Surgery Office Follow Up Note     Date of Encounter: 2023     Name: Eliazar Dan  : 1970     Referred By: No ref. provider found  PCP: Jose Fernandez MD    Chief Complaint:    Chief Complaint   Patient presents with   • Thoracic Aneurysm     1 year follow up for an ascending thoracic aneurysm.       Subjective      History of Present Illness:    Eliazar Dan is a 53 y.o. male non-smoker followed by Dr. Rodriguez for a sending aortic aneurysm.  PMH: HTN, HLD CAD s/p P PCI to LAD per Dr. Sawyer, and asending aortic aneurysm.  Prior to PCI of his CAD lesion May 2022 patient had presented to [facility] ER for angina.  A CTA chest revealed incidental finding of 4.4 cm aortic aneurysm.  Dr. Rodriguez was consulted.  He and Dr. Sawyer discussed the CAD and it was decided this was best served by PCI.  However, Dr. Rodriguez recommended annual surveillance with CTA for his ascending aorta.  He presents today for follow-up.  He is compliant with aspirin, Plavix, Zetia, and Crestor.  His blood pressure is well controlled.  Patient denies any unusual chest pain or back pain. Patient reports FMH aneurysmal disease in father and one uncle.    Review of Systems:  Review of Systems   Constitutional: Negative. Negative for chills, decreased appetite, diaphoresis, fever, malaise/fatigue, night sweats and weight loss.   HENT: Negative for congestion, hoarse voice, sore throat and stridor.    Cardiovascular: Negative.  Negative for chest pain, claudication, dyspnea on exertion, irregular heartbeat, leg swelling, near-syncope, orthopnea, palpitations, paroxysmal nocturnal dyspnea and syncope.   Respiratory: Negative.  Negative for cough, hemoptysis, shortness of breath, sleep disturbances due to breathing, snoring, sputum production and wheezing.    Hematologic/Lymphatic: Negative for adenopathy and bleeding problem. Bruises/bleeds easily.   Skin: Negative.  Negative for color change,  dry skin, itching, poor wound healing and rash.   Musculoskeletal: Negative.  Negative for arthritis, back pain, falls and muscle weakness.   Gastrointestinal: Negative.  Negative for abdominal pain, anorexia, constipation, diarrhea, hematochezia, melena, nausea and vomiting.   Neurological: Positive for headaches. Negative for difficulty with concentration, disturbances in coordination, dizziness, loss of balance, numbness, seizures, vertigo and weakness.   Psychiatric/Behavioral: Negative.  Negative for altered mental status, depression, memory loss and substance abuse. The patient does not have insomnia and is not nervous/anxious.    Allergic/Immunologic: Negative.  Negative for persistent infections.       I have reviewed the following portions of the patient's history: allergies, current medications, past family history, past medical history, past social history, past surgical history, problem list and ROS and confirm it's accurate.    Allergies:  No Known Allergies    Medications:      Current Outpatient Medications:   •  Aspirin Low Dose 81 MG EC tablet, TAKE 1 TABLET BY MOUTH EVERY DAY, Disp: 90 tablet, Rfl: 3  •  clopidogrel (PLAVIX) 75 MG tablet, TAKE 1 TABLET BY MOUTH EVERY DAY, Disp: 90 tablet, Rfl: 3  •  cyclobenzaprine (FLEXERIL) 5 MG tablet, Take 2 tablets by mouth At Night As Needed for Muscle Spasms., Disp: 30 tablet, Rfl: 1  •  ezetimibe (ZETIA) 10 MG tablet, Take 1 tablet by mouth Daily., Disp: 90 tablet, Rfl: 3  •  metoprolol succinate XL (TOPROL-XL) 25 MG 24 hr tablet, TAKE 1 TABLET BY MOUTH EVERY DAY, Disp: 90 tablet, Rfl: 2  •  nitroglycerin (NITROSTAT) 0.4 MG SL tablet, Place 1 tablet under the tongue Every 5 (Five) Minutes As Needed for Chest Pain., Disp: 25 tablet, Rfl: 5  •  rosuvastatin (CRESTOR) 20 MG tablet, Take 1 tablet by mouth Daily., Disp: 90 tablet, Rfl: 3    History:   Past Medical History:   Diagnosis Date   • Abnormal liver enzymes    • Benign prostatic hyperplasia 9/21   •  "Cataract 8/22   • Coronary artery disease 3/2022   • Epididymal cyst     Right, 1 cm continue to watch   • Hyperlipidemia    • Hypertension    • Plantar fasciitis     Stable   • Skin tag        Past Surgical History:   Procedure Laterality Date   • CARDIAC CATHETERIZATION Left 05/18/2022    Procedure: Left Heart Cath;  Surgeon: Jairo Sawyer IV, MD;  Location:  [facility] C[facility]ASIVE [facility];  Service: Cardiovascular;  Laterality: Left;   • CARDIAC CATHETERIZATION      06/06/2022 PER DR. SAWYER   • CARDIAC CATHETERIZATION N/A 06/06/2022    Procedure: Percutaneous Coronary Intervention to LAD;  Surgeon: Jairo Sawyer IV, MD;  Location:  [facility]AS[facility];  Service: Cardiovascular;  Laterality: N/A;   • CATARACT EXTRACTION Bilateral    • CORONARY STENT PLACEMENT  6/6/22   • PROSTATE BIOPSY N/A 01/15/2020    Procedure: TRANSRECTAL ULTRASOUND GUIDED BIOPSY;  Surgeon: Ritesh Ribera MD;  Location:  [facility] OR;  Service: Urology   • WISDOM TOOTH EXTRACTION  2008       Social History     Socioeconomic History   • Marital status:    • Number of children: 1   Tobacco Use   • Smoking status: Never   • Smokeless tobacco: Never   Vaping Use   • Vaping Use: Never used   Substance and Sexual Activity   • Alcohol use: Never     Comment: Rarely   • Drug use: Never   • Sexual activity: Not Currently     Partners: Female        Family History   Problem Relation Age of Onset   • Heart disease Father    • Hyperlipidemia Father    • Heart attack Father 47   • Hypertension Father    • Hyperlipidemia Sister    • Hyperlipidemia Brother    • Hyperlipidemia Other    • Hypertension Other        Objective   Physical Exam:  Vitals:    05/16/23 0957 05/16/23 0958   BP: 136/88 133/89   BP Location: Right arm Left arm   Patient Position: Sitting Sitting   Pulse: 75    Temp: 97.5 °F (36.4 °C)    SpO2: 98%    Weight: 79.4 kg (175 lb)    Height: 175.3 cm (69\")  Comment: patient reports       Body mass index " is 25.84 kg/m².    Physical Exam  Vitals reviewed.   Constitutional:       General: He is not in acute distress.  HENT:      Head: Normocephalic and atraumatic.   Eyes:      General: Lids are normal.      Conjunctiva/sclera: Conjunctivae normal.      Pupils: Pupils are equal, round, and reactive to light.   Neck:      Vascular: No carotid bruit.   Cardiovascular:      Rate and Rhythm: Normal rate and regular rhythm.      Pulses:           Carotid pulses are 2+ on the right side and 2+ on the left side.     Heart sounds: S1 normal and S2 normal. No murmur heard.  Pulmonary:      Effort: Pulmonary effort is normal. No respiratory distress.      Breath sounds: Normal breath sounds.   Musculoskeletal:         General: Normal range of motion.      Cervical back: Normal range of motion and neck supple.      Right lower leg: No edema.      Left lower leg: No edema.   Skin:     General: Skin is warm and dry.      Capillary Refill: Capillary refill takes less than 2 seconds.   Neurological:      General: No focal deficit present.      Mental Status: He is alert and oriented to person, place, and time.   Psychiatric:         Attention and Perception: Attention normal.         Mood and Affect: Mood normal.         Speech: Speech normal.         Behavior: Behavior normal. Behavior is cooperative.         Imaging/Labs:  CT Chest With & Without Contrast Diagnostic: 5/5/2023 Personally reviewed and agree with Radiologist assessment  Impression:  1. Stable dilatation of the ascending aorta at 45 mm, recommend one-year follow-up.    2. Stable hepatic cyst and bilateral adrenal lesions most consistent with benign adrenal adenomas.    3. Fatty liver.  This report was signed and finalized on 5/5/2023 5:13 PM by Susana Randall MD.       Duplex Carotid Ultrasound 5/18/22  Interpretation Summary  · No evidence of hemodynamically significant stenosis of bilateral carotid arteries.  · Intimal thickening and minimal scattered smooth plaque is  noted.    TTE Interpretation Summary 5/23/22  • Left ventricular systolic function is normal. Estimated left ventricular EF = 62%.  • Left ventricular diastolic function was normal.  • The cardiac valves are anatomically and functionally normal.     CT ANGIOGRAM CHEST W WO CONTRAST- 4/11/22    IMPRESSION:  1. Mild aneurysmal dilatation of the thoracic aorta, measuring 4.4 cm in greatest transverse dimension. There is no evidence of dissection or other acute process. No additional acute findings in the chest.  2.  Minimally atherosclerotic, nonaneurysmal abdominal aorta. No acute nonvascular findings in the abdomen.  3.  Nonspecific and favored benign 19 mm left adrenal nodule. This likely represents a benign adenoma but is incompletely characterized.  Report was finalized on 4/11/2022 1:48 PM by Liborio Trejo.    Assessment / Plan      Assessment / Plan:  1. Aneurysm of ascending aorta without rupture (Primary)  2. Coronary artery disease s/p PCI  3. Primary hypertension  -  53 y.o. male non-smoker followed by Dr. Rodriguez for ascending aortic aneurysm.    - PMH: HTN, HLD CAD s/p P PCI to LAD, and asending aortic aneurysm.    - CTA chest 4/11/22 during ER visit for angina demonstrated incidental finding of 4.4 cm aortic aneurysm.    - Patient presents today for annual surveillance with CTA chest:  Ascending aortic aneurysm stable measuring 4.5 cm  - Compliant with aspirin, Plavix, Zetia, and Crestor.    - Blood pressure is well controlled.  Recommend checking BP regularly at home 2 x weekly and report to PCP if averaging >130/80 mmHg  - FMH aneurysmal disease but no bicuspid aortic valve per Echo 2022.  Discussed intervention per Dr. Rodriguez @ 5.0 cm.  - Recommend continue annual surveillance.      Patient Education: Continue to avoid use of tobacco products      Follow Up:   Return in about 1 year (around 5/16/2024) for CTA chest.   Or sooner for any further concerns or worsening sign and symptoms. If unable to  reach us in the office please dial 911 or go to the nearest emergency department.      LARRY Zhao  [facility] Cardiothoracic Surgery    Time Spent: I spent 32 minutes caring for Eliazar on this date of service. This time includes time spent by me in the following activities: preparing for the visit, reviewing tests, obtaining and/or reviewing a separately obtained history, performing a medically appropriate examination and/or evaluation, counseling and educating the patient/family/caregiver, ordering medications, tests, or procedures, documenting information in the medical record, independently interpreting results and communicating that information with the patient/family/caregiver and care coordination.

## 2023-06-05 ENCOUNTER — TELEPHONE (OUTPATIENT)
Dept: UROLOGY | Facility: CLINIC | Age: 53
End: 2023-06-05
Payer: COMMERCIAL

## 2023-06-12 ENCOUNTER — OFFICE VISIT (OUTPATIENT)
Dept: UROLOGY | Facility: CLINIC | Age: 53
End: 2023-06-12
Payer: COMMERCIAL

## 2023-06-12 VITALS
WEIGHT: 178 LBS | SYSTOLIC BLOOD PRESSURE: 130 MMHG | BODY MASS INDEX: 26.36 KG/M2 | DIASTOLIC BLOOD PRESSURE: 72 MMHG | HEIGHT: 69 IN | OXYGEN SATURATION: 99 % | TEMPERATURE: 98.5 F | HEART RATE: 74 BPM | RESPIRATION RATE: 12 BRPM

## 2023-06-12 DIAGNOSIS — R39.9 LOWER URINARY TRACT SYMPTOMS (LUTS): ICD-10-CM

## 2023-06-12 DIAGNOSIS — R97.20 ELEVATED PROSTATE SPECIFIC ANTIGEN (PSA): Primary | ICD-10-CM

## 2023-06-12 LAB
BILIRUB BLD-MCNC: NEGATIVE MG/DL
CLARITY, POC: CLEAR
COLOR UR: YELLOW
EXPIRATION DATE: NORMAL
GLUCOSE UR STRIP-MCNC: NEGATIVE MG/DL
KETONES UR QL: NEGATIVE
LEUKOCYTE EST, POC: NEGATIVE
Lab: NORMAL
NITRITE UR-MCNC: NEGATIVE MG/ML
PH UR: 6 [PH] (ref 5–8)
PROT UR STRIP-MCNC: NEGATIVE MG/DL
RBC # UR STRIP: NEGATIVE /UL
SP GR UR: 1.01 (ref 1–1.03)
UROBILINOGEN UR QL: NORMAL

## 2023-06-12 PROCEDURE — 81003 URINALYSIS AUTO W/O SCOPE: CPT | Performed by: UROLOGY

## 2023-06-12 PROCEDURE — 99213 OFFICE O/P EST LOW 20 MIN: CPT | Performed by: UROLOGY

## 2023-06-12 RX ORDER — TAMSULOSIN HYDROCHLORIDE 0.4 MG/1
1 CAPSULE ORAL NIGHTLY
Qty: 30 CAPSULE | Refills: 11 | Status: SHIPPED | OUTPATIENT
Start: 2023-06-12

## 2023-06-12 NOTE — PROGRESS NOTES
Chief Complaint   Patient presents with    Follow-up     PSA      Elevated PSA        HPI  Mr. Dan is a 53 y.o. male with history below in assessment, who presents for follow up.     At this visit nocturia worsened    Past Medical History:   Diagnosis Date    Abnormal liver enzymes     Aneurysm 4/2022    Benign prostatic hyperplasia 9/21    Cataract 8/22    Coronary artery disease 3/2022    Elevated PSA 9/21    Epididymal cyst     Right, 1 cm continue to watch    Hyperlipidemia     Hypertension     Plantar fasciitis     Stable    Skin tag        Past Surgical History:   Procedure Laterality Date    CARDIAC CATHETERIZATION Left 05/18/2022    Procedure: Left Heart Cath;  Surgeon: Jairo Sawyer IV, MD;  Location:  [facility];  Service: Cardiovascular;  Laterality: Left;    CARDIAC CATHETERIZATION      06/06/2022 PER DR. SAWYER    CARDIAC CATHETERIZATION N/A 06/06/2022    Procedure: Percutaneous Coronary Intervention to LAD;  Surgeon: Jairo Sawyer IV, MD;  Location:  [facility];  Service: Cardiovascular;  Laterality: N/A;    CATARACT EXTRACTION Bilateral     CORONARY ANGIOPLASTY  6/6/2022    CORONARY STENT PLACEMENT  6/6/22    PROSTATE BIOPSY N/A 01/15/2020    Procedure: TRANSRECTAL ULTRASOUND GUIDED BIOPSY;  Surgeon: Ritesh Ribera MD;  Location:  [facility] OR;  Service: Urology    WISDOM TOOTH EXTRACTION  2008         Current Outpatient Medications:     aspirin (Aspirin Low Dose) 81 MG EC tablet, Take 1 tablet by mouth Daily., Disp: 90 tablet, Rfl: 3    cyclobenzaprine (FLEXERIL) 5 MG tablet, Take 2 tablets by mouth At Night As Needed for Muscle Spasms., Disp: 30 tablet, Rfl: 1    ezetimibe (ZETIA) 10 MG tablet, Take 1 tablet by mouth Daily., Disp: 90 tablet, Rfl: 3    metoprolol succinate XL (TOPROL-XL) 25 MG 24 hr tablet, Take 1 tablet by mouth Daily., Disp: 90 tablet, Rfl: 3    nitroglycerin (NITROSTAT) 0.4 MG SL tablet, Place 1 tablet under the  "tongue Every 5 (Five) Minutes As Needed for Chest Pain., Disp: 25 tablet, Rfl: 5    rosuvastatin (CRESTOR) 20 MG tablet, Take 1 tablet by mouth Daily., Disp: 90 tablet, Rfl: 3     Physical Exam  Visit Vitals  /72 (BP Location: Right arm, Patient Position: Sitting, Cuff Size: Adult)   Pulse 74   Temp 98.5 °F (36.9 °C) (Temporal)   Resp 12   Ht 175.3 cm (69\")   Wt 80.7 kg (178 lb)   SpO2 99%   BMI 26.29 kg/m²       Labs  Brief Urine Lab Results  (Last result in the past 365 days)        Color   Clarity   Blood   Leuk Est   Nitrite   Protein   CREAT   Urine HCG        06/12/23 1027 Yellow   Clear   Negative   Negative   Negative   Negative                   Lab Results   Component Value Date    GLUCOSE 96 05/12/2023    CALCIUM 9.2 05/12/2023     05/12/2023    K 3.8 05/12/2023    CO2 29.0 05/12/2023     05/12/2023    BUN 11 05/12/2023    CREATININE 0.90 05/12/2023    EGFRIFAFRI 107 09/27/2021    EGFRIFNONA 88 09/27/2021    BCR 12.2 05/12/2023    ANIONGAP 8.0 05/12/2023       Lab Results   Component Value Date    WBC 7.58 05/12/2023    HGB 15.9 05/12/2023    HCT 45.5 05/12/2023    MCV 86.7 05/12/2023     05/12/2023            Lab Results   Component Value Date    PSA 5.530 (H) 09/28/2021    PSA 5.070 (H) 09/27/2021    PSA 3.420 09/19/2020             Radiographic Studies  CT Chest With & Without Contrast Diagnostic  Result Date: 5/5/2023  Stable dilatation of the ascending aorta at 45 mm, recommend one-year follow-up.  Stable hepatic cyst and bilateral adrenal lesions most consistent with benign adrenal adenomas.  Fatty liver.  This report was signed and finalized on 5/5/2023 5:13 PM by Susana Randall MD.        I have reviewed above labs and imaging.     Assessment  53 y.o. male with who presents with elevated PSA.  This is a new diagnosis of uncertain clinical prognosis. Neg Prostate Bx in 2020. 60cc prostate on mRI, which was done w/o PiRADS so results are not interpretable. Worsening of chronic " LUTS. IPSS high. Stopped flomax due to cataract surgery.    Plan  PSA  Restart flomax

## 2023-11-27 ENCOUNTER — OFFICE VISIT (OUTPATIENT)
Dept: INTERNAL MEDICINE | Facility: CLINIC | Age: 53
End: 2023-11-27
Payer: COMMERCIAL

## 2023-11-27 VITALS
RESPIRATION RATE: 16 BRPM | HEART RATE: 68 BPM | BODY MASS INDEX: 27.4 KG/M2 | OXYGEN SATURATION: 98 % | TEMPERATURE: 98.2 F | WEIGHT: 185 LBS | HEIGHT: 69 IN | DIASTOLIC BLOOD PRESSURE: 80 MMHG | SYSTOLIC BLOOD PRESSURE: 128 MMHG

## 2023-11-27 DIAGNOSIS — Z12.5 PROSTATE CANCER SCREENING: ICD-10-CM

## 2023-11-27 DIAGNOSIS — R53.83 OTHER FATIGUE: ICD-10-CM

## 2023-11-27 DIAGNOSIS — I25.119 CORONARY ARTERY DISEASE INVOLVING NATIVE CORONARY ARTERY OF NATIVE HEART WITH ANGINA PECTORIS: ICD-10-CM

## 2023-11-27 DIAGNOSIS — I71.20 THORACIC AORTIC ANEURYSM WITHOUT RUPTURE, UNSPECIFIED PART: Primary | ICD-10-CM

## 2023-11-27 DIAGNOSIS — G47.33 OSA (OBSTRUCTIVE SLEEP APNEA): ICD-10-CM

## 2023-11-27 PROCEDURE — 99214 OFFICE O/P EST MOD 30 MIN: CPT | Performed by: INTERNAL MEDICINE

## 2023-11-27 NOTE — PROGRESS NOTES
"Subjective     Patient ID: Eliazar Dan is a 53 y.o. male. Patient is here for management of multiple medical problems.     Chief Complaint   Patient presents with    Annual Exam     New patient form Dr. Fernandez     History of Present Illness   Cad s/p stent. 3/2022.             The following portions of the patient's history were reviewed and updated as appropriate: allergies, current medications, past family history, past medical history, past social history, past surgical history and problem list.    Review of Systems    Current Outpatient Medications:     aspirin (Aspirin Low Dose) 81 MG EC tablet, Take 1 tablet by mouth Daily., Disp: 90 tablet, Rfl: 3    cyclobenzaprine (FLEXERIL) 5 MG tablet, Take 2 tablets by mouth At Night As Needed for Muscle Spasms., Disp: 30 tablet, Rfl: 1    metoprolol succinate XL (TOPROL-XL) 25 MG 24 hr tablet, Take 1 tablet by mouth Daily., Disp: 90 tablet, Rfl: 3    nitroglycerin (NITROSTAT) 0.4 MG SL tablet, Place 1 tablet under the tongue Every 5 (Five) Minutes As Needed for Chest Pain., Disp: 25 tablet, Rfl: 5    rosuvastatin (CRESTOR) 20 MG tablet, Take 1 tablet by mouth Daily., Disp: 90 tablet, Rfl: 3    tamsulosin (FLOMAX) 0.4 MG capsule 24 hr capsule, Take 1 capsule by mouth Every Night., Disp: 30 capsule, Rfl: 11    ezetimibe (ZETIA) 10 MG tablet, Take 1 tablet by mouth Daily., Disp: 90 tablet, Rfl: 3    Objective      Blood pressure 128/80, pulse 68, temperature 98.2 °F (36.8 °C), resp. rate 16, height 175.3 cm (69\"), weight 83.9 kg (185 lb), SpO2 98%.            Physical Exam     General Appearance:    Alert, cooperative, no distress, appears stated age   Head:    Normocephalic, without obvious abnormality, atraumatic   Eyes:    PERRL, conjunctiva/corneas clear, EOM's intact   Ears:    Normal TM's and external ear canals, both ears   Nose:   Nares normal, septum midline, mucosa normal, no drainage   or sinus tenderness   Throat:  Malipatti 3. Nasal septal deviation to the " left.  Lips, mucosa, and tongue normal; teeth and gums normal   Neck:   Supple, symmetrical, trachea midline, no adenopathy;        thyroid:  No enlargement/tenderness/nodules; no carotid    bruit or JVD   Back:     Symmetric, no curvature, ROM normal, no CVA tenderness   Lungs:     Clear to auscultation bilaterally, respirations unlabored   Chest wall:    No tenderness or deformity   Heart:    Regular rate and rhythm, S1 and S2 normal, no murmur,        rub or gallop   Abdomen:     Soft, non-tender, bowel sounds active all four quadrants,     no masses, no organomegaly   Extremities:   Extremities normal, atraumatic, no cyanosis or edema   Pulses:   2+ and symmetric all extremities   Skin:   Skin color, texture, turgor normal, no rashes or lesions   Lymph nodes:   Cervical, supraclavicular, and axillary nodes normal   Neurologic:   CNII-XII intact. Normal strength, sensation and reflexes       throughout      Results for orders placed or performed in visit on 06/12/23   POC Urinalysis Dipstick, Automated    Specimen: Urine   Result Value Ref Range    Color Yellow Yellow, Straw, Dark Yellow, Ellen    Clarity, UA Clear Clear    Specific Gravity  1.010 1.005 - 1.030    pH, Urine 6.0 5.0 - 8.0    Leukocytes Negative Negative    Nitrite, UA Negative Negative    Protein, POC Negative Negative mg/dL    Glucose, UA Negative Negative mg/dL    Ketones, UA Negative Negative    Urobilinogen, UA Normal Normal, 0.2 E.U./dL    Bilirubin Negative Negative    Blood, UA Negative Negative    Lot Number [id number]     Expiration Date 10/25/2024          Assessment & Plan   Stable dilatation of the ascending aorta at 45 mm, recommend  one-year follow-up.     Stable hepatic cyst and bilateral adrenal lesions most consistent with  benign adrenal adenomas.    Occ poor sleep on back. Has to sleep on side. Waking at time with HA.   Eval for blanca.                  Diagnoses and all orders for this visit:    1. Thoracic aortic aneurysm  without rupture, unspecified part (Primary)  -     Lipid Panel  -     CBC & Differential  -     Vitamin B12  -     Comprehensive Metabolic Panel  -     TSH  -     T4, Free  -     Hemoglobin A1c  -     Vitamin D,25-Hydroxy  -     [place] Spotted Fever, IgM  -     Julio [place] Spotted Fever, IgG  -     Lyme Disease, PCR - , Arm, Left  -     Ehrlichia Antibody Panel    2. Coronary artery disease involving native coronary artery of native heart with angina pectoris  -     Lipid Panel  -     CBC & Differential  -     Vitamin B12  -     Comprehensive Metabolic Panel  -     TSH  -     T4, Free  -     Hemoglobin A1c  -     Vitamin D,25-Hydroxy  -     [place] Spotted Fever, IgM  -     Julio [place] Spotted Fever, IgG  -     Lyme Disease, PCR - , Arm, Left  -     Ehrlichia Antibody Panel    3. Prostate cancer screening  -     PSA Screen    4. Other fatigue  -     Home Sleep Study    5. RADHA (obstructive sleep apnea)  -     Home Sleep Study      Return in about 3 months (around 2/27/2024).          There are no Patient Instructions on file for this visit.     Irineo Marley MD    Assessment & Plan

## 2023-12-01 DIAGNOSIS — G47.33 OSA (OBSTRUCTIVE SLEEP APNEA): Primary | ICD-10-CM

## 2023-12-08 ENCOUNTER — LAB (OUTPATIENT)
Dept: LAB | Facility: HOSPITAL | Age: 53
End: 2023-12-08
Payer: COMMERCIAL

## 2023-12-08 LAB
25(OH)D3 SERPL-MCNC: 21.4 NG/ML (ref 30–100)
ALBUMIN SERPL-MCNC: 4.5 G/DL (ref 3.5–5.2)
ALBUMIN/GLOB SERPL: 1.9 G/DL
ALP SERPL-CCNC: 82 U/L (ref 39–117)
ALT SERPL W P-5'-P-CCNC: 35 U/L (ref 1–41)
ANION GAP SERPL CALCULATED.3IONS-SCNC: 11.4 MMOL/L (ref 5–15)
AST SERPL-CCNC: 28 U/L (ref 1–40)
BASOPHILS # BLD AUTO: 0.05 10*3/MM3 (ref 0–0.2)
BASOPHILS NFR BLD AUTO: 0.6 % (ref 0–1.5)
BILIRUB SERPL-MCNC: 0.5 MG/DL (ref 0–1.2)
BUN SERPL-MCNC: 11 MG/DL (ref 6–20)
BUN/CREAT SERPL: 12 (ref 7–25)
CALCIUM SPEC-SCNC: 9.2 MG/DL (ref 8.6–10.5)
CHLORIDE SERPL-SCNC: 107 MMOL/L (ref 98–107)
CHOLEST SERPL-MCNC: 121 MG/DL (ref 0–200)
CO2 SERPL-SCNC: 24.6 MMOL/L (ref 22–29)
CREAT SERPL-MCNC: 0.92 MG/DL (ref 0.76–1.27)
DEPRECATED RDW RBC AUTO: 37.2 FL (ref 37–54)
EGFRCR SERPLBLD CKD-EPI 2021: 98.2 ML/MIN/1.73
EOSINOPHIL # BLD AUTO: 0.2 10*3/MM3 (ref 0–0.4)
EOSINOPHIL NFR BLD AUTO: 2.6 % (ref 0.3–6.2)
ERYTHROCYTE [DISTWIDTH] IN BLOOD BY AUTOMATED COUNT: 12 % (ref 12.3–15.4)
GLOBULIN UR ELPH-MCNC: 2.4 GM/DL
GLUCOSE SERPL-MCNC: 104 MG/DL (ref 65–99)
HBA1C MFR BLD: 5.2 % (ref 4.8–5.6)
HCT VFR BLD AUTO: 48.3 % (ref 37.5–51)
HDLC SERPL-MCNC: 34 MG/DL (ref 40–60)
HGB BLD-MCNC: 16.2 G/DL (ref 13–17.7)
IMM GRANULOCYTES # BLD AUTO: 0.03 10*3/MM3 (ref 0–0.05)
IMM GRANULOCYTES NFR BLD AUTO: 0.4 % (ref 0–0.5)
LDLC SERPL CALC-MCNC: 70 MG/DL (ref 0–100)
LDLC/HDLC SERPL: 2.04 {RATIO}
LYMPHOCYTES # BLD AUTO: 2.26 10*3/MM3 (ref 0.7–3.1)
LYMPHOCYTES NFR BLD AUTO: 29 % (ref 19.6–45.3)
MCH RBC QN AUTO: 28.9 PG (ref 26.6–33)
MCHC RBC AUTO-ENTMCNC: 33.5 G/DL (ref 31.5–35.7)
MCV RBC AUTO: 86.3 FL (ref 79–97)
MONOCYTES # BLD AUTO: 0.65 10*3/MM3 (ref 0.1–0.9)
MONOCYTES NFR BLD AUTO: 8.3 % (ref 5–12)
NEUTROPHILS NFR BLD AUTO: 4.6 10*3/MM3 (ref 1.7–7)
NEUTROPHILS NFR BLD AUTO: 59.1 % (ref 42.7–76)
NRBC BLD AUTO-RTO: 0 /100 WBC (ref 0–0.2)
PLATELET # BLD AUTO: 210 10*3/MM3 (ref 140–450)
PMV BLD AUTO: 10.1 FL (ref 6–12)
POTASSIUM SERPL-SCNC: 3.6 MMOL/L (ref 3.5–5.2)
PROT SERPL-MCNC: 6.9 G/DL (ref 6–8.5)
PSA SERPL-MCNC: 4.29 NG/ML (ref 0–4)
RBC # BLD AUTO: 5.6 10*6/MM3 (ref 4.14–5.8)
SODIUM SERPL-SCNC: 143 MMOL/L (ref 136–145)
T4 FREE SERPL-MCNC: 1.13 NG/DL (ref 0.93–1.7)
TRIGL SERPL-MCNC: 89 MG/DL (ref 0–150)
TSH SERPL DL<=0.05 MIU/L-ACNC: 1.88 UIU/ML (ref 0.27–4.2)
VIT B12 BLD-MCNC: 199 PG/ML (ref 211–946)
VLDLC SERPL-MCNC: 17 MG/DL (ref 5–40)
WBC NRBC COR # BLD AUTO: 7.79 10*3/MM3 (ref 3.4–10.8)

## 2023-12-08 PROCEDURE — 82306 VITAMIN D 25 HYDROXY: CPT | Performed by: INTERNAL MEDICINE

## 2023-12-08 PROCEDURE — 84153 ASSAY OF PSA TOTAL: CPT | Performed by: UROLOGY

## 2023-12-08 PROCEDURE — 86666 EHRLICHIA ANTIBODY: CPT | Performed by: INTERNAL MEDICINE

## 2023-12-08 PROCEDURE — 80050 GENERAL HEALTH PANEL: CPT | Performed by: INTERNAL MEDICINE

## 2023-12-08 PROCEDURE — 83036 HEMOGLOBIN GLYCOSYLATED A1C: CPT | Performed by: INTERNAL MEDICINE

## 2023-12-08 PROCEDURE — 87476 LYME DIS DNA AMP PROBE: CPT | Performed by: INTERNAL MEDICINE

## 2023-12-08 PROCEDURE — 86757 RICKETTSIA ANTIBODY: CPT | Performed by: INTERNAL MEDICINE

## 2023-12-08 PROCEDURE — 84439 ASSAY OF FREE THYROXINE: CPT | Performed by: UROLOGY

## 2023-12-08 PROCEDURE — 82607 VITAMIN B-12: CPT | Performed by: INTERNAL MEDICINE

## 2023-12-08 PROCEDURE — 80061 LIPID PANEL: CPT | Performed by: UROLOGY

## 2023-12-08 PROCEDURE — G0103 PSA SCREENING: HCPCS | Performed by: UROLOGY

## 2023-12-08 RX ORDER — VITAMIN B COMPLEX
1 CAPSULE ORAL DAILY
Qty: 30 CAPSULE | Refills: 11 | Status: SHIPPED | OUTPATIENT
Start: 2023-12-08 | End: 2024-12-07

## 2023-12-11 LAB
A PHAGOCYTOPH IGG TITR SER IF: NEGATIVE {TITER}
A PHAGOCYTOPH IGM TITR SER IF: NEGATIVE {TITER}
E CHAFFEENSIS IGG TITR SER IF: NEGATIVE {TITER}
E CHAFFEENSIS IGM TITR SER IF: NEGATIVE {TITER}
R RICKETTSI IGG SER QL IA: NEGATIVE
R RICKETTSI IGM SER-ACNC: 0.44 INDEX (ref 0–0.89)
RESULT COMMENT:: NORMAL

## 2023-12-13 LAB — B BURGDOR DNA SPEC QL NAA+PROBE: NEGATIVE

## 2024-02-16 ENCOUNTER — OFFICE VISIT (OUTPATIENT)
Dept: INTERNAL MEDICINE | Facility: CLINIC | Age: 54
End: 2024-02-16
Payer: COMMERCIAL

## 2024-02-16 VITALS
RESPIRATION RATE: 16 BRPM | HEIGHT: 69 IN | SYSTOLIC BLOOD PRESSURE: 124 MMHG | HEART RATE: 75 BPM | BODY MASS INDEX: 27.4 KG/M2 | OXYGEN SATURATION: 99 % | WEIGHT: 185 LBS | TEMPERATURE: 97.5 F | DIASTOLIC BLOOD PRESSURE: 74 MMHG

## 2024-02-16 DIAGNOSIS — L98.9 SKIN LESION: ICD-10-CM

## 2024-02-16 DIAGNOSIS — M54.81 OCCIPITAL NEURALGIA OF LEFT SIDE: ICD-10-CM

## 2024-02-16 DIAGNOSIS — I71.21 ANEURYSM OF ASCENDING AORTA WITHOUT RUPTURE: Primary | ICD-10-CM

## 2024-02-16 PROCEDURE — 99214 OFFICE O/P EST MOD 30 MIN: CPT | Performed by: INTERNAL MEDICINE

## 2024-04-04 DIAGNOSIS — I71.21 ANEURYSM OF ASCENDING AORTA WITHOUT RUPTURE: Primary | ICD-10-CM

## 2024-04-28 DIAGNOSIS — I25.119 CORONARY ARTERY DISEASE INVOLVING NATIVE CORONARY ARTERY OF NATIVE HEART WITH ANGINA PECTORIS: ICD-10-CM

## 2024-04-29 RX ORDER — NITROGLYCERIN 0.4 MG/1
0.4 TABLET SUBLINGUAL
Qty: 25 TABLET | Refills: 5 | Status: SHIPPED | OUTPATIENT
Start: 2024-04-29

## 2024-04-29 RX ORDER — METOPROLOL SUCCINATE 25 MG/1
25 TABLET, EXTENDED RELEASE ORAL DAILY
Qty: 90 TABLET | Refills: 3 | Status: SHIPPED | OUTPATIENT
Start: 2024-04-29

## 2024-05-02 ENCOUNTER — HOSPITAL ENCOUNTER (OUTPATIENT)
Dept: CT IMAGING | Facility: HOSPITAL | Age: 54
Discharge: HOME | End: 2024-05-02
Admitting: REGISTERED NURSE
Payer: COMMERCIAL

## 2024-05-02 DIAGNOSIS — I71.21 ANEURYSM OF ASCENDING AORTA WITHOUT RUPTURE: ICD-10-CM

## 2024-05-02 PROCEDURE — 71275 CT ANGIOGRAPHY CHEST: CPT

## 2024-05-02 PROCEDURE — 25510000001 IOPAMIDOL 61 % SOLUTION: Performed by: REGISTERED NURSE

## 2024-05-02 RX ADMIN — IOPAMIDOL 100 ML: 612 INJECTION, SOLUTION INTRAVENOUS at 16:56

## 2024-05-13 NOTE — PROGRESS NOTES
"    Cardiology Outpatient Visit      Identification: Eliazar Dan is a 54 y.o. male who resides in [place], [place]    Reason for visit:  Coronary Artery Disease      Subjective      Patient is a 54-year-old gentleman who returns today for follow-up of his coronary artery disease, thoracic aortic aneurysm and cardiac risk factors.  Since his last visit he has done well without any hospitalizations or new medical diagnoses.  He has been following CT surgery for his ascending aortic aneurysm.  He had a CT scan earlier this month that showed no change in stability at 45 mm.  He denies exertional chest pain or shortness of breath.  His last lipid panel was within normal limits.    Review of Systems   Constitutional: Negative for malaise/fatigue.   Eyes:  Negative for vision loss in left eye and vision loss in right eye.   Cardiovascular:  Negative for chest pain, dyspnea on exertion, near-syncope, orthopnea, palpitations, paroxysmal nocturnal dyspnea and syncope.   Musculoskeletal:  Negative for myalgias.   Neurological:  Negative for brief paralysis, excessive daytime sleepiness, focal weakness, numbness, paresthesias and weakness.   All other systems reviewed and are negative.      No Known Allergies      Current Outpatient Medications   Medication Instructions    aspirin (ASPIRIN LOW DOSE) 81 mg, Oral, Daily    b complex vitamins capsule 1 capsule, Oral, Daily    cyclobenzaprine (FLEXERIL) 10 mg, Oral, Nightly PRN    Diclofenac Sodium (VOLTAREN) 4 g, Topical, 4 Times Daily PRN    ezetimibe (ZETIA) 10 mg, Oral, Daily    metoprolol succinate XL (TOPROL-XL) 25 mg, Oral, Daily    nitroglycerin (NITROSTAT) 0.4 mg, Sublingual, Every 5 Minutes PRN    rosuvastatin (CRESTOR) 20 mg, Oral, Daily    tamsulosin (FLOMAX) 0.4 mg, Oral, Nightly    Vitamin D3 1,000 Units, Oral, Daily         Objective     /70 (BP Location: Right arm, Patient Position: Sitting)   Pulse 69   Ht 165.1 cm (65\")   Wt 83.5 kg (184 lb)   " SpO2 97%   BMI 30.62 kg/m²       Constitutional:       General: Awake.      Appearance: Healthy appearance.   Pulmonary:      Effort: Pulmonary effort is normal.      Breath sounds: Normal breath sounds.   Cardiovascular:      Normal rate. Regular rhythm.      Murmurs: There is no murmur.   Pulses:     Intact distal pulses.   Edema:     Peripheral edema absent.   Skin:     General: Skin is warm and dry.   Neurological:      Mental Status: Alert and oriented to person, place and time.   Psychiatric:         Behavior: Behavior is cooperative.         Result Review  (reviewed with patient):        ECG 12 Lead    Date/Time: 5/21/2024 3:36 PM  Performed by: Jody Caldera APRN    Authorized by: Jody Caldera APRN  Comparison: compared with previous ECG from 3/17/2022  Comparison to previous ECG: Previous EKG had inferior ST changes not present on current EKG  Rhythm: sinus rhythm  BPM: 69    Clinical impression: normal ECG  Comments: QT/QTc 388/415 MS           Lab Results   Component Value Date    GLUCOSE 104 (H) 12/08/2023    BUN 11 12/08/2023    CREATININE 0.92 12/08/2023    EGFR 98.2 12/08/2023    BCR 12.0 12/08/2023    K 3.6 12/08/2023    CO2 24.6 12/08/2023    CALCIUM 9.2 12/08/2023    PROTENTOTREF 7.0 09/27/2021    ALBUMIN 4.5 12/08/2023    BILITOT 0.5 12/08/2023    AST 28 12/08/2023    ALT 35 12/08/2023     Lab Results   Component Value Date    WBC 7.79 12/08/2023    HGB 16.2 12/08/2023    HCT 48.3 12/08/2023    MCV 86.3 12/08/2023     12/08/2023     Lab Results   Component Value Date    CHOL 121 12/08/2023    CHLPL 176 09/27/2021    TRIG 89 12/08/2023    HDL 34 (L) 12/08/2023    LDL 70 12/08/2023     Lab Results   Component Value Date    HGBA1C 5.20 12/08/2023           Assessment     Diagnoses and all orders for this visit:    1. Coronary artery disease involving native coronary artery of native heart with angina pectoris (Primary)  Overview:  New onset anginal symptoms, 3/2022  EKG  (3/17/2020): Sinus rhythm.  Inferior infarct pattern with inferior T wave inversion  No obvious coronary calcification on CT chest, 4/11/2022  Exercise nuclear stress (4/27/2022): Inferior ischemia with development of ST elevation on EKG with exercise  Cardiac catheterization (5/18/2022): Severe 2-vessel CAD (LAD, RPDA chronic total occlusion)  Staged PCI of proximal/mid LAD using Xience 3.25 x 33 mm KEON, 6/6/2022    Assessment & Plan:  No signs or symptoms of angina  Continue aspirin 81 mg daily  Continue metoprolol succinate 25 mg daily  Sublingual nitroglycerin as needed for any episodes of angina    Orders:  -     metoprolol succinate XL (TOPROL-XL) 25 MG 24 hr tablet; Take 1 tablet by mouth Daily.  Dispense: 90 tablet; Refill: 3    2. Aneurysm of descending thoracic aorta without rupture  Overview:  CT chest (4/11/2022): Mild aneurysmal dilation of thoracic aorta (4.4 cm).  CT chest (5/5/2023): Stable dilation of ascending aorta (45 mm)  CT chest (5/4//2024):Stable dilation ascending aorta 45 mm    Assessment & Plan:        3. Hyperlipidemia LDL goal <70  Overview:  High intensity statin therapy indicated given the presence of CAD    Assessment & Plan:   Continue Crestor 20 mg daily  Continue Zetia 10 mg daily    Orders:  -     rosuvastatin (CRESTOR) 20 MG tablet; Take 1 tablet by mouth Daily.  Dispense: 90 tablet; Refill: 3  -     ezetimibe (ZETIA) 10 MG tablet; Take 1 tablet by mouth Daily.  Dispense: 90 tablet; Refill: 3    Other orders  -     ECG 12 Lead          Plan   Continue current medications      Follow-up   Return in about 1 year (around 5/21/2025), or if symptoms worsen or fail to improve, for Follow-up with Dr. Sawyer next visit.    Jody Caldera, APRN  5/21/2024

## 2024-05-21 ENCOUNTER — OFFICE VISIT (OUTPATIENT)
Dept: CARDIOLOGY | Facility: CLINIC | Age: 54
End: 2024-05-21
Payer: COMMERCIAL

## 2024-05-21 VITALS
OXYGEN SATURATION: 97 % | BODY MASS INDEX: 30.66 KG/M2 | HEIGHT: 65 IN | HEART RATE: 69 BPM | SYSTOLIC BLOOD PRESSURE: 118 MMHG | WEIGHT: 184 LBS | DIASTOLIC BLOOD PRESSURE: 70 MMHG

## 2024-05-21 DIAGNOSIS — E78.5 HYPERLIPIDEMIA LDL GOAL <70: Chronic | ICD-10-CM

## 2024-05-21 DIAGNOSIS — I71.23 ANEURYSM OF DESCENDING THORACIC AORTA WITHOUT RUPTURE: ICD-10-CM

## 2024-05-21 DIAGNOSIS — I25.119 CORONARY ARTERY DISEASE INVOLVING NATIVE CORONARY ARTERY OF NATIVE HEART WITH ANGINA PECTORIS: Primary | ICD-10-CM

## 2024-05-21 PROCEDURE — 99214 OFFICE O/P EST MOD 30 MIN: CPT | Performed by: NURSE PRACTITIONER

## 2024-05-21 PROCEDURE — 93000 ELECTROCARDIOGRAM COMPLETE: CPT | Performed by: NURSE PRACTITIONER

## 2024-05-21 RX ORDER — METOPROLOL SUCCINATE 25 MG/1
25 TABLET, EXTENDED RELEASE ORAL DAILY
Qty: 90 TABLET | Refills: 3 | Status: SHIPPED | OUTPATIENT
Start: 2024-05-21

## 2024-05-21 RX ORDER — ROSUVASTATIN CALCIUM 20 MG/1
20 TABLET, COATED ORAL DAILY
Qty: 90 TABLET | Refills: 3 | Status: SHIPPED | OUTPATIENT
Start: 2024-05-21

## 2024-05-21 RX ORDER — EZETIMIBE 10 MG/1
10 TABLET ORAL DAILY
Qty: 90 TABLET | Refills: 3 | Status: SHIPPED | OUTPATIENT
Start: 2024-05-21

## 2024-05-21 NOTE — ASSESSMENT & PLAN NOTE
No signs or symptoms of angina  Continue aspirin 81 mg daily  Continue metoprolol succinate 25 mg daily  Sublingual nitroglycerin as needed for any episodes of angina

## 2024-05-27 NOTE — PROGRESS NOTES
[facility] Cardiothoracic Surgery Office Follow Up Note     Date of Encounter: 2024     Name: Eliazar Dan  : 1970     Referred By: No ref. provider found  PCP: Irineo Marley MD    Chief Complaint:    Chief Complaint   Patient presents with    Follow-up     1 year surveillance ascending aortic aneurysm with recent CTA to review.        Subjective      History of Present Illness:    Eliazar Dan is a 54 y.o. male non-smoker followed by Dr. Rodriguez for ascending aortic aneurysm.  PMH: HTN, HLD CAD s/p P PCI to LAD per Dr. Sawyer, and asending aortic aneurysm.  Prior to PCI of his CAD lesion May 2022 patient had presented to [facility] ER for angina.  A CTA chest revealed incidental finding of 4.4 cm aortic aneurysm.  Dr. Rodriguze was consulted.  He and Dr. Sawyer discussed the CAD and it was decided this was best served by PCI.  However, Dr. Rodriguez recommended annual surveillance with CTA for his ascending aorta.  He presents today for follow-up.  He is compliant with aspirin, Plavix, Zetia, and Crestor.  His blood pressure is well controlled.  Patient denies any unusual chest pain or back pain. Patient reports FMH aneurysmal disease in father and one uncle     Review of Systems:  Review of Systems   Constitutional: Negative. Negative for chills, decreased appetite, diaphoresis, fever, malaise/fatigue, night sweats, weight gain and weight loss.   HENT: Negative.  Negative for hoarse voice.    Eyes: Negative.  Negative for blurred vision, double vision and visual disturbance.   Cardiovascular: Negative.  Negative for chest pain, claudication, dyspnea on exertion, irregular heartbeat, leg swelling, near-syncope, orthopnea, palpitations, paroxysmal nocturnal dyspnea and syncope.   Respiratory: Negative.  Negative for cough, hemoptysis, shortness of breath, sputum production and wheezing.    Hematologic/Lymphatic: Negative.  Negative for adenopathy and bleeding problem. Does not  bruise/bleed easily.   Skin:  Negative for color change, nail changes, poor wound healing and rash.   Musculoskeletal: Negative.  Negative for back pain, falls and muscle cramps.   Gastrointestinal: Negative.  Negative for abdominal pain, dysphagia and heartburn.   Genitourinary: Negative.  Negative for flank pain.   Neurological: Negative.  Negative for brief paralysis, disturbances in coordination, dizziness, focal weakness, headaches, light-headedness, loss of balance, numbness, paresthesias, sensory change, vertigo and weakness.   Psychiatric/Behavioral: Negative.  Negative for depression and suicidal ideas.    Allergic/Immunologic: Negative for persistent infections.       I have reviewed the following portions of the patient's history: problem list, current medications, allergies, past surgical history, past medical history, past social history, past family history, and ROS and confirm it's accurate.    Allergies:  No Known Allergies    Medications:      Current Outpatient Medications:     aspirin (Aspirin Low Dose) 81 MG EC tablet, Take 1 tablet by mouth Daily., Disp: 90 tablet, Rfl: 3    b complex vitamins capsule, Take 1 capsule by mouth Daily., Disp: 30 capsule, Rfl: 11    Cholecalciferol (Vitamin D3) 25 MCG (1000 UT) capsule, Take 1 capsule by mouth Daily., Disp: 90 capsule, Rfl: 11    cyclobenzaprine (FLEXERIL) 5 MG tablet, Take 2 tablets by mouth At Night As Needed for Muscle Spasms., Disp: 30 tablet, Rfl: 1    ezetimibe (ZETIA) 10 MG tablet, Take 1 tablet by mouth Daily., Disp: 90 tablet, Rfl: 3    metoprolol succinate XL (TOPROL-XL) 25 MG 24 hr tablet, Take 1 tablet by mouth Daily., Disp: 90 tablet, Rfl: 3    nitroglycerin (NITROSTAT) 0.4 MG SL tablet, Place 1 tablet under the tongue Every 5 (Five) Minutes As Needed for Chest Pain., Disp: 25 tablet, Rfl: 5    rosuvastatin (CRESTOR) 20 MG tablet, Take 1 tablet by mouth Daily., Disp: 90 tablet, Rfl: 3    tamsulosin (FLOMAX) 0.4 MG capsule 24 hr capsule,  Take 1 capsule by mouth Every Night., Disp: 30 capsule, Rfl: 11    Diclofenac Sodium (VOLTAREN) 1 % gel gel, Apply 4 g topically to the appropriate area as directed 4 (Four) Times a Day As Needed (pain). (Patient not taking: Reported on 5/21/2024), Disp: 100 g, Rfl: 1    History:   Past Medical History:   Diagnosis Date    Abnormal liver enzymes     Aneurysm 4/2022    Benign prostatic hyperplasia 9/21    Cataract 8/22    Coronary artery disease 3/2022    Elevated PSA 9/21    Epididymal cyst     Right, 1 cm continue to watch    Hyperlipidemia     Hypertension     Plantar fasciitis     Stable    Skin tag        Past Surgical History:   Procedure Laterality Date    CARDIAC CATHETERIZATION Left 05/18/2022    Procedure: Left Heart Cath;  Surgeon: Jairo Sawyer IV, MD;  Location:  [facility] CATH INVASIVE LOCATION;  Service: Cardiovascular;  Laterality: Left;    CARDIAC CATHETERIZATION      06/06/2022 PER DR. SAWYER    CARDIAC CATHETERIZATION N/A 06/06/2022    Procedure: Percutaneous Coronary Intervention to LAD;  Surgeon: Jairo Sawyer IV, MD;  Location:  [facility] CATH INVASIVE LOCATION;  Service: Cardiovascular;  Laterality: N/A;    CATARACT EXTRACTION Bilateral 2023    CORONARY ANGIOPLASTY  6/6/2022    CORONARY STENT PLACEMENT  6/6/22    PROSTATE BIOPSY N/A 01/15/2020    Procedure: TRANSRECTAL ULTRASOUND GUIDED BIOPSY;  Surgeon: Ritesh Ribera MD;  Location:  [facility] OR;  Service: Urology    WISDOM TOOTH EXTRACTION  2008       Social History     Socioeconomic History    Marital status:     Number of children: 1   Tobacco Use    Smoking status: Never     Passive exposure: Past    Smokeless tobacco: Never   Vaping Use    Vaping status: Never Used    Passive vaping exposure: Yes   Substance and Sexual Activity    Alcohol use: Never     Comment: Rarely    Drug use: Never    Sexual activity: Yes     Partners: Female        Family History   Problem Relation Age of Onset    Hypertension Mother      "Kidney disease Mother     Heart disease Father     Hyperlipidemia Father     Heart attack Father     Hypertension Father     Hyperlipidemia Sister     Hyperlipidemia Brother     Hyperlipidemia Other     Hypertension Other        Objective   Physical Exam:  Vitals:    05/29/24 1333   BP: 118/82   BP Location: Left arm   Patient Position: Sitting   Pulse: 64   Temp: 96.9 °F (36.1 °C)   SpO2: 98%   Weight: 83.3 kg (183 lb 9.6 oz)   Height: 175.3 cm (69\")      Body mass index is 27.11 kg/m².    Physical Exam  Vitals reviewed.   Constitutional:       General: He is not in acute distress.     Appearance: He is well-developed and well-groomed.   HENT:      Head: Normocephalic and atraumatic.   Eyes:      General: Lids are normal.      Conjunctiva/sclera: Conjunctivae normal.      Pupils: Pupils are equal, round, and reactive to light.   Neck:      Vascular: No carotid bruit or JVD.   Cardiovascular:      Rate and Rhythm: Normal rate and regular rhythm.      Heart sounds: S1 normal and S2 normal. No murmur heard.  Pulmonary:      Effort: Pulmonary effort is normal. No respiratory distress.      Breath sounds: No decreased breath sounds, wheezing, rhonchi or rales.   Musculoskeletal:         General: Normal range of motion.      Cervical back: Normal range of motion and neck supple.      Right lower leg: No edema.      Left lower leg: No edema.   Lymphadenopathy:      Upper Body:      Right upper body: No supraclavicular adenopathy.      Left upper body: No supraclavicular adenopathy.   Skin:     General: Skin is warm and dry.      Capillary Refill: Capillary refill takes less than 2 seconds.   Neurological:      General: No focal deficit present.      Mental Status: He is alert and oriented to person, place, and time.   Psychiatric:         Attention and Perception: Attention normal.         Mood and Affect: Mood normal.         Speech: Speech normal.         Behavior: Behavior normal. Behavior is cooperative. "         Imaging/Labs:  CT Angiogram Chest: 5/3/2024 (Personally reviewed.  Agree w/ ascending aorta 4.5 cm)  Impression:  No significant interval change in dilatation of the ascending aorta, recommend 1 year follow-up.  Stable hepatic cyst.   This report was signed on 5/3/2024 7:42 AM by Susana Randall MD.        CT Chest With & Without Contrast Diagnostic: 5/5/2023   Impression:  1. Stable dilatation of the ascending aorta at 45 mm, recommend one-year follow-up.    2. Stable hepatic cyst and bilateral adrenal lesions most consistent with benign adrenal adenomas.    3. Fatty liver.  This report was signed and finalized on 5/5/2023 5:13 PM by Susana Randall MD.      Duplex Carotid Ultrasound 5/18/22  Interpretation Summary  · No evidence of hemodynamically significant stenosis of bilateral carotid arteries.  · Intimal thickening and minimal scattered smooth plaque is noted.     TTE Interpretation Summary 5/23/22  Left ventricular systolic function is normal. Estimated left ventricular EF = 62%.  Left ventricular diastolic function was normal.  The cardiac valves are anatomically and functionally normal.     CT ANGIOGRAM CHEST W WO CONTRAST- 4/11/22    IMPRESSION:  1. Mild aneurysmal dilatation of the thoracic aorta, measuring 4.4 cm in greatest transverse dimension. There is no evidence of dissection or other acute process. No additional acute findings in the chest.  2.  Minimally atherosclerotic, nonaneurysmal abdominal aorta. No acute nonvascular findings in the abdomen.  3.  Nonspecific and favored benign 19 mm left adrenal nodule. This likely represents a benign adenoma but is incompletely characterized.  Report was finalized on 4/11/2022 1:48 PM by Liborio Trejo.    Assessment / Plan      Assessment / Plan:  1. Aneurysm of ascending aorta without rupture (Primary)  - 54 y.o. male non-smoker followed by Dr. Rodriguez for ascending aortic aneurysm 4.4 cm.    - PMH: HTN, HLD CAD s/p P PCI to LAD per Dr. Sawyer, and  asending aortic aneurysm.    -  He presents today for follow-up CTA:  stable ascending aorta aneurysm @ 4.5 cm.  - Compliant with aspirin, Plavix, Zetia, and Crestor.    - His blood pressure is well controlled.    - Denies any unusual chest pain or back pain.   - FMH aneurysmal disease in father and one uncle   - TTE 2022 demonstrated no aortic valvular disease  - Will plan to continue annual surveillance imaging w/  CTA chest    Patient Education: Continue to maintain controlled blood pressure with goal less than 130/80 mmHg.  Continue to avoid tobacco use.      Follow Up:   Return in about 1 year (around 5/29/2025) for CTA chest.   Or sooner for any further concerns or worsening sign and symptoms. If unable to reach us in the office please dial 911 or go to the nearest emergency department.      LARRY Zhao  [facility] Cardiothoracic Surgery    Time Spent: I spent 20 minutes caring for Eliazar on this date of service. This time includes time spent by me in the following activities: preparing for the visit, reviewing tests, obtaining and/or reviewing a separately obtained history, performing a medically appropriate examination and/or evaluation, counseling and educating the patient/family/caregiver, documenting information in the medical record, independently interpreting results and communicating that information with the patient/family/caregiver, and care coordination.

## 2024-05-29 ENCOUNTER — OFFICE VISIT (OUTPATIENT)
Dept: CARDIAC SURGERY | Facility: CLINIC | Age: 54
End: 2024-05-29
Payer: COMMERCIAL

## 2024-05-29 VITALS
HEIGHT: 69 IN | BODY MASS INDEX: 27.19 KG/M2 | DIASTOLIC BLOOD PRESSURE: 82 MMHG | WEIGHT: 183.6 LBS | TEMPERATURE: 96.9 F | SYSTOLIC BLOOD PRESSURE: 118 MMHG | HEART RATE: 64 BPM | OXYGEN SATURATION: 98 %

## 2024-05-29 DIAGNOSIS — I71.21 ANEURYSM OF ASCENDING AORTA WITHOUT RUPTURE: Primary | ICD-10-CM

## 2024-05-29 PROCEDURE — 99213 OFFICE O/P EST LOW 20 MIN: CPT | Performed by: NURSE PRACTITIONER

## 2024-06-03 DIAGNOSIS — R39.9 LOWER URINARY TRACT SYMPTOMS (LUTS): ICD-10-CM

## 2024-06-04 DIAGNOSIS — R97.20 ELEVATED PROSTATE SPECIFIC ANTIGEN (PSA): Primary | ICD-10-CM

## 2024-06-04 RX ORDER — TAMSULOSIN HYDROCHLORIDE 0.4 MG/1
1 CAPSULE ORAL NIGHTLY
Qty: 30 CAPSULE | Refills: 0 | Status: SHIPPED | OUTPATIENT
Start: 2024-06-04

## 2024-07-03 DIAGNOSIS — R39.9 LOWER URINARY TRACT SYMPTOMS (LUTS): ICD-10-CM

## 2024-07-03 RX ORDER — TAMSULOSIN HYDROCHLORIDE 0.4 MG/1
1 CAPSULE ORAL NIGHTLY
Qty: 30 CAPSULE | Refills: 0 | Status: SHIPPED | OUTPATIENT
Start: 2024-07-03

## 2024-08-06 DIAGNOSIS — R39.9 LOWER URINARY TRACT SYMPTOMS (LUTS): ICD-10-CM

## 2024-08-06 RX ORDER — TAMSULOSIN HYDROCHLORIDE 0.4 MG/1
1 CAPSULE ORAL NIGHTLY
Qty: 30 CAPSULE | Refills: 0 | Status: CANCELLED | OUTPATIENT
Start: 2024-08-06

## 2024-08-12 ENCOUNTER — TELEPHONE (OUTPATIENT)
Dept: UROLOGY | Facility: CLINIC | Age: 54
End: 2024-08-12
Payer: COMMERCIAL

## 2024-08-12 DIAGNOSIS — R39.9 LOWER URINARY TRACT SYMPTOMS (LUTS): ICD-10-CM

## 2024-08-12 RX ORDER — TAMSULOSIN HYDROCHLORIDE 0.4 MG/1
1 CAPSULE ORAL NIGHTLY
Qty: 30 CAPSULE | Refills: 0 | Status: SHIPPED | OUTPATIENT
Start: 2024-08-12

## 2024-08-12 NOTE — TELEPHONE ENCOUNTER
Caller: RADHA MARIA GUADALUPE    Relationship: SELF    Best call back number: [phone]    Requested Prescriptions: FLOMAX  Requested Prescriptions      No prescriptions requested or ordered in this encounter        Pharmacy where request should be sent:  tamsulosin (FLOMAX) 0.4 MG capsule    Last office visit with prescribing clinician: Visit date not found   Last telemedicine visit with prescribing clinician: Visit date not found   Next office visit with prescribing clinician: 12/10/2024     Additional details provided by patient: PT IS OUT OF FLOMAX AND NEEDS A REFILL.    Does the patient have less than a 3 day supply:  [x] Yes  [] No    Would you like a call back once the refill request has been completed: [x] Yes [] No    If the office needs to give you a call back, can they leave a voicemail: [x] Yes [] No    Sushant Bearden Rep   08/12/24 13:10 EDT

## 2024-08-21 ENCOUNTER — OFFICE VISIT (OUTPATIENT)
Dept: INTERNAL MEDICINE | Facility: CLINIC | Age: 54
End: 2024-08-21
Payer: COMMERCIAL

## 2024-08-21 VITALS
RESPIRATION RATE: 16 BRPM | BODY MASS INDEX: 27.25 KG/M2 | WEIGHT: 184 LBS | HEIGHT: 69 IN | SYSTOLIC BLOOD PRESSURE: 118 MMHG | TEMPERATURE: 97.5 F | OXYGEN SATURATION: 97 % | DIASTOLIC BLOOD PRESSURE: 72 MMHG | HEART RATE: 68 BPM

## 2024-08-21 DIAGNOSIS — G47.33 OSA (OBSTRUCTIVE SLEEP APNEA): ICD-10-CM

## 2024-08-21 DIAGNOSIS — N40.0 BENIGN PROSTATIC HYPERPLASIA WITHOUT LOWER URINARY TRACT SYMPTOMS: Primary | ICD-10-CM

## 2024-08-21 PROCEDURE — 99214 OFFICE O/P EST MOD 30 MIN: CPT | Performed by: INTERNAL MEDICINE

## 2024-08-21 NOTE — PROGRESS NOTES
"Subjective     Patient ID: Eliazar Dan is a 54 y.o. male. Patient is here for management of multiple medical problems.     Chief Complaint   Patient presents with    Hyperlipidemia     History of Present Illness   Hyperlip    BPH. Elevated psa.   Had prostate bx.     Hx of cad.    Interpreted sleep.   Ok refreshed on waking.   + snoring.   Fatigue.   Takes naps during the day.          The following portions of the patient's history were reviewed and updated as appropriate: allergies, current medications, past family history, past medical history, past social history, past surgical history and problem list.    Review of Systems    Current Outpatient Medications:     aspirin (Aspirin Low Dose) 81 MG EC tablet, Take 1 tablet by mouth Daily., Disp: 90 tablet, Rfl: 3    b complex vitamins capsule, Take 1 capsule by mouth Daily., Disp: 30 capsule, Rfl: 11    Cholecalciferol (Vitamin D3) 25 MCG (1000 UT) capsule, Take 1 capsule by mouth Daily., Disp: 90 capsule, Rfl: 11    cyclobenzaprine (FLEXERIL) 5 MG tablet, Take 2 tablets by mouth At Night As Needed for Muscle Spasms., Disp: 30 tablet, Rfl: 1    ezetimibe (ZETIA) 10 MG tablet, Take 1 tablet by mouth Daily., Disp: 90 tablet, Rfl: 3    metoprolol succinate XL (TOPROL-XL) 25 MG 24 hr tablet, Take 1 tablet by mouth Daily., Disp: 90 tablet, Rfl: 3    nitroglycerin (NITROSTAT) 0.4 MG SL tablet, Place 1 tablet under the tongue Every 5 (Five) Minutes As Needed for Chest Pain., Disp: 25 tablet, Rfl: 5    rosuvastatin (CRESTOR) 20 MG tablet, Take 1 tablet by mouth Daily., Disp: 90 tablet, Rfl: 3    tamsulosin (FLOMAX) 0.4 MG capsule 24 hr capsule, Take 1 capsule by mouth Every Night., Disp: 30 capsule, Rfl: 0    Objective      Blood pressure 118/72, pulse 68, temperature 97.5 °F (36.4 °C), resp. rate 16, height 175.3 cm (69\"), weight 83.5 kg (184 lb), SpO2 97%.            Physical Exam     General Appearance:    Alert, cooperative, no distress, appears stated age "   Head:    Normocephalic, without obvious abnormality, atraumatic   Eyes:    PERRL, conjunctiva/corneas clear, EOM's intact   Ears:    Normal TM's and external ear canals, both ears   Nose:   Nares normal, septum midline, mucosa normal, no drainage   or sinus tenderness   Throat:   Narroewed oral air way.  Mallampati. 3 Lips, mucosa, and tongue normal; teeth and gums normal   Neck:   Supple, symmetrical, trachea midline, no adenopathy;        thyroid:  No enlargement/tenderness/nodules; no carotid    bruit or JVD   Back:     Symmetric, no curvature, ROM normal, no CVA tenderness   Lungs:     Clear to auscultation bilaterally, respirations unlabored   Chest wall:    No tenderness or deformity   Heart:    Regular rate and rhythm, S1 and S2 normal, no murmur,        rub or gallop   Abdomen:     Soft, non-tender, bowel sounds active all four quadrants,     no masses, no organomegaly   Extremities:   Extremities normal, atraumatic, no cyanosis or edema   Pulses:   2+ and symmetric all extremities   Skin:   Skin color, texture, turgor normal, no rashes or lesions   Lymph nodes:   Cervical, supraclavicular, and axillary nodes normal   Neurologic:   CNII-XII intact. Normal strength, sensation and reflexes       throughout      Results for orders placed or performed in visit on 11/27/23   Lyme Disease, PCR - Blood,    Specimen: Blood   Result Value Ref Range    Lyme Disease(B.burgdorferi)PCR Negative Negative   Lipid Panel    Specimen: Blood   Result Value Ref Range    Total Cholesterol 121 0 - 200 mg/dL    Triglycerides 89 0 - 150 mg/dL    HDL Cholesterol 34 (L) 40 - 60 mg/dL    LDL Cholesterol  70 0 - 100 mg/dL    VLDL Cholesterol 17 5 - 40 mg/dL    LDL/HDL Ratio 2.04    Vitamin B12    Specimen: Blood   Result Value Ref Range    Vitamin B-12 199 (L) 211 - 946 pg/mL   PSA Screen    Specimen: Blood   Result Value Ref Range    PSA 4.290 (H) 0.000 - 4.000 ng/mL   Comprehensive Metabolic Panel    Specimen: Blood   Result Value  Ref Range    Glucose 104 (H) 65 - 99 mg/dL    BUN 11 6 - 20 mg/dL    Creatinine 0.92 0.76 - 1.27 mg/dL    Sodium 143 136 - 145 mmol/L    Potassium 3.6 3.5 - 5.2 mmol/L    Chloride 107 98 - 107 mmol/L    CO2 24.6 22.0 - 29.0 mmol/L    Calcium 9.2 8.6 - 10.5 mg/dL    Total Protein 6.9 6.0 - 8.5 g/dL    Albumin 4.5 3.5 - 5.2 g/dL    ALT (SGPT) 35 1 - 41 U/L    AST (SGOT) 28 1 - 40 U/L    Alkaline Phosphatase 82 39 - 117 U/L    Total Bilirubin 0.5 0.0 - 1.2 mg/dL    Globulin 2.4 gm/dL    A/G Ratio 1.9 g/dL    BUN/Creatinine Ratio 12.0 7.0 - 25.0    Anion Gap 11.4 5.0 - 15.0 mmol/L    eGFR 98.2 >60.0 mL/min/1.73   TSH    Specimen: Blood   Result Value Ref Range    TSH 1.880 0.270 - 4.200 uIU/mL   T4, Free    Specimen: Blood   Result Value Ref Range    Free T4 1.13 0.93 - 1.70 ng/dL   Hemoglobin A1c    Specimen: Blood   Result Value Ref Range    Hemoglobin A1C 5.20 4.80 - 5.60 %   Vitamin D,25-Hydroxy    Specimen: Blood   Result Value Ref Range    25 Hydroxy, Vitamin D 21.4 (L) 30.0 - 100.0 ng/ml   [place] Spotted Fever, IgM    Specimen: Blood   Result Value Ref Range    [facility] IgM 0.44 0.00 - 0.89 index   [facility] Spotted Fever, IgG    Specimen: Blood   Result Value Ref Range    RMS IgG Negative Negative   CBC Auto Differential    Specimen: Blood   Result Value Ref Range    WBC 7.79 3.40 - 10.80 10*3/mm3    RBC 5.60 4.14 - 5.80 10*6/mm3    Hemoglobin 16.2 13.0 - 17.7 g/dL    Hematocrit 48.3 37.5 - 51.0 %    MCV 86.3 79.0 - 97.0 fL    MCH 28.9 26.6 - 33.0 pg    MCHC 33.5 31.5 - 35.7 g/dL    RDW 12.0 (L) 12.3 - 15.4 %    RDW-SD 37.2 37.0 - 54.0 fl    MPV 10.1 6.0 - 12.0 fL    Platelets 210 140 - 450 10*3/mm3    Neutrophil % 59.1 42.7 - 76.0 %    Lymphocyte % 29.0 19.6 - 45.3 %    Monocyte % 8.3 5.0 - 12.0 %    Eosinophil % 2.6 0.3 - 6.2 %    Basophil % 0.6 0.0 - 1.5 %    Immature Grans % 0.4 0.0 - 0.5 %    Neutrophils, Absolute 4.60 1.70 - 7.00 10*3/mm3    Lymphocytes, Absolute 2.26 0.70 - 3.10 10*3/mm3    Monocytes,  Absolute 0.65 0.10 - 0.90 10*3/mm3    Eosinophils, Absolute 0.20 0.00 - 0.40 10*3/mm3    Basophils, Absolute 0.05 0.00 - 0.20 10*3/mm3    Immature Grans, Absolute 0.03 0.00 - 0.05 10*3/mm3    nRBC 0.0 0.0 - 0.2 /100 WBC   Ehrlichia Antibody Panel    Specimen: Blood   Result Value Ref Range    E. chaffeensis (HME) IgG Titer Negative Neg:<1:64    E. chaffeensis (HME) IgM Titer Negative Neg:<1:20    HGE IgG Titer Negative Neg:<1:64    HGE IgM Titer Negative Neg:<1:20    RESULT COMMENT: Comment          Assessment & Plan       Interpreted sleep.   Ok refreshed on waking.   + snoring.   Fatigue.   Takes naps during the day.        Diagnoses and all orders for this visit:    1. Benign prostatic hyperplasia without lower urinary tract symptoms (Primary)  -     Home Sleep Study    2. RADHA (obstructive sleep apnea)  -     Home Sleep Study      Return in about 6 months (around 2/21/2025).          There are no Patient Instructions on file for this visit.     Irineo Marley MD    Assessment & Plan       Answers submitted by the patient for this visit:  Primary Reason for Visit (Submitted on 8/14/2024)  What is the primary reason for your visit?: Other  Other (Submitted on 8/14/2024)  Please describe your symptoms.: Follow up  Have you had these symptoms before?: No  How long have you been having these symptoms?: Greater than 2 weeks

## 2024-08-23 ENCOUNTER — LAB (OUTPATIENT)
Dept: LAB | Facility: HOSPITAL | Age: 54
End: 2024-08-23
Payer: COMMERCIAL

## 2024-08-23 DIAGNOSIS — R97.20 ELEVATED PROSTATE SPECIFIC ANTIGEN (PSA): ICD-10-CM

## 2024-08-23 PROCEDURE — 84153 ASSAY OF PSA TOTAL: CPT | Performed by: UROLOGY

## 2024-08-26 ENCOUNTER — OFFICE VISIT (OUTPATIENT)
Dept: UROLOGY | Facility: CLINIC | Age: 54
End: 2024-08-26
Payer: COMMERCIAL

## 2024-08-26 VITALS
TEMPERATURE: 97.7 F | BODY MASS INDEX: 27.25 KG/M2 | HEIGHT: 69 IN | SYSTOLIC BLOOD PRESSURE: 142 MMHG | HEART RATE: 82 BPM | DIASTOLIC BLOOD PRESSURE: 86 MMHG | WEIGHT: 184 LBS | OXYGEN SATURATION: 98 %

## 2024-08-26 DIAGNOSIS — R33.8 ACUTE URINARY RETENTION: ICD-10-CM

## 2024-08-26 DIAGNOSIS — R39.9 LOWER URINARY TRACT SYMPTOMS (LUTS): ICD-10-CM

## 2024-08-26 DIAGNOSIS — R97.20 ELEVATED PROSTATE SPECIFIC ANTIGEN (PSA): Primary | ICD-10-CM

## 2024-08-26 LAB
BILIRUB BLD-MCNC: NEGATIVE MG/DL
CLARITY, POC: CLEAR
COLOR UR: NORMAL
EXPIRATION DATE: NORMAL
GLUCOSE UR STRIP-MCNC: NEGATIVE MG/DL
KETONES UR QL: NEGATIVE
LEUKOCYTE EST, POC: NEGATIVE
Lab: NORMAL
NITRITE UR-MCNC: NEGATIVE MG/ML
PH UR: 5.5 [PH] (ref 5–8)
PROT UR STRIP-MCNC: NEGATIVE MG/DL
RBC # UR STRIP: NEGATIVE /UL
SP GR UR: 1.01 (ref 1–1.03)
UROBILINOGEN UR QL: NORMAL

## 2024-08-26 PROCEDURE — 51798 US URINE CAPACITY MEASURE: CPT | Performed by: NURSE PRACTITIONER

## 2024-08-26 PROCEDURE — 99214 OFFICE O/P EST MOD 30 MIN: CPT | Performed by: NURSE PRACTITIONER

## 2024-08-26 PROCEDURE — 81003 URINALYSIS AUTO W/O SCOPE: CPT | Performed by: NURSE PRACTITIONER

## 2024-08-26 NOTE — PROGRESS NOTES
Office Visit Established Male Patient     Patient Name: Eliazar Dan  : 1970   MRN: [id number]     Chief Complaint:   Chief Complaint   Patient presents with    Elevated PSA     Follow up       History of Present Illness: Mr. Eliazar Dan is a 54 y.o. male who presents today for follow up with history of BPH on Flomax and elevated PSA.  PSA has risen from 4.290 in December to 5.3 ng/mL on .  MRI pelvis in  in  was done without PIRADS.  Negative prostate biopsy in .  Low risk 4K score in .      His IPSS is 25 with most bothersome symptom being weak stream.  Denies prostatitis and UTI symptoms today.  No fever, chills, sweats.     IPSS Questionnaire (AUA-7):  Incomplete emptying  Over the past month, how often have you had a sensation of not emptying your bladder completely after you finished urinating?: About half the time (24)  Frequency  Over the past month, how often have you had to urinate again less than two hours after you finishied urinating ?: About half the time (24)  Intermittency  Over the past month, how often have you found you stopped and started again several time when you urinated ?: More than half the time (24)  Urgency  Over the last month, how often have you found it difficult  have you found it difficult to postpone urination ?: More than half the time (24)  Weak Stream  Over the past month, how often have you had a weak urinary stream ?: Almost always (24)  Straining  Over the past month, how often have you had to push or strain to begin urination ?: More than half the time (24)  Nocturia  Over the past month, how many times did you most typically get up to urinate from the time you went to bed until the time you got up in the morning ?: 2 times (24)  Quality of life due to urinary symptoms  If you were to spend the rest of your life with your urinary condition the  way it is now, how would feel about that?: Mixed - about equally satisfied (08/26/24 0936)    Scores  Total IPSS Score: (!) 25 (08/26/24 0936)  Total Score = Symptomatic Level: Severely symptomatic: 20-35 (08/26/24 0936)     Subjective      Review of System: ROS reviewed by me and is negative unless otherwise noted in HPI.      Past Medical History:   Past Medical History:   Diagnosis Date    Abnormal liver enzymes     Aneurysm 4/2022    Benign prostatic hyperplasia 9/21    Cataract 8/22    Coronary artery disease 3/2022    Elevated PSA 9/21    Epididymal cyst     Right, 1 cm continue to watch    Hyperlipidemia     Hypertension     Plantar fasciitis     Stable    Skin tag      Past Surgical History:   Past Surgical History:   Procedure Laterality Date    CARDIAC CATHETERIZATION Left 05/18/2022    Procedure: Left Heart Cath;  Surgeon: Jairo Sawyer IV, MD;  Location:  [facility] CATH INVASIVE LOCATION;  Service: Cardiovascular;  Laterality: Left;    CARDIAC CATHETERIZATION      06/06/2022 PER DR. SAWYER    CARDIAC CATHETERIZATION N/A 06/06/2022    Procedure: Percutaneous Coronary Intervention to LAD;  Surgeon: Jairo Sawyer IV, MD;  Location:  [facility] CATH INVASIVE LOCATION;  Service: Cardiovascular;  Laterality: N/A;    CATARACT EXTRACTION Bilateral 2023    CORONARY ANGIOPLASTY  6/6/2022    CORONARY STENT PLACEMENT  6/6/22    PROSTATE BIOPSY N/A 01/15/2020    Procedure: TRANSRECTAL ULTRASOUND GUIDED BIOPSY;  Surgeon: Ritesh Ribera MD;  Location:  [facility] OR;  Service: Urology    WISDOM TOOTH EXTRACTION  2008     Family History:   Family History   Problem Relation Age of Onset    Heart disease Father     Hyperlipidemia Father     Heart attack Father     Hypertension Father     Hypertension Mother     Kidney disease Mother     Hyperlipidemia Sister     Hyperlipidemia Brother     Hyperlipidemia Other     Hypertension Other     Prostate cancer Neg Hx     Kidney cancer Neg Hx      Social History:  "  Social History     Socioeconomic History    Marital status:     Number of children: 1   Tobacco Use    Smoking status: Never     Passive exposure: Past    Smokeless tobacco: Never   Vaping Use    Vaping status: Never Used   Substance and Sexual Activity    Alcohol use: Not Currently     Comment: Rarely    Drug use: Never    Sexual activity: Yes     Partners: Female       Medications:     Current Outpatient Medications:     aspirin (Aspirin Low Dose) 81 MG EC tablet, Take 1 tablet by mouth Daily., Disp: 90 tablet, Rfl: 3    b complex vitamins capsule, Take 1 capsule by mouth Daily., Disp: 30 capsule, Rfl: 11    Cholecalciferol (Vitamin D3) 25 MCG (1000 UT) capsule, Take 1 capsule by mouth Daily., Disp: 90 capsule, Rfl: 11    cyclobenzaprine (FLEXERIL) 5 MG tablet, Take 2 tablets by mouth At Night As Needed for Muscle Spasms., Disp: 30 tablet, Rfl: 1    ezetimibe (ZETIA) 10 MG tablet, Take 1 tablet by mouth Daily., Disp: 90 tablet, Rfl: 3    metoprolol succinate XL (TOPROL-XL) 25 MG 24 hr tablet, Take 1 tablet by mouth Daily., Disp: 90 tablet, Rfl: 3    nitroglycerin (NITROSTAT) 0.4 MG SL tablet, Place 1 tablet under the tongue Every 5 (Five) Minutes As Needed for Chest Pain., Disp: 25 tablet, Rfl: 5    rosuvastatin (CRESTOR) 20 MG tablet, Take 1 tablet by mouth Daily., Disp: 90 tablet, Rfl: 3    tamsulosin (FLOMAX) 0.4 MG capsule 24 hr capsule, Take 1 capsule by mouth Every Night., Disp: 30 capsule, Rfl: 0    Allergies:   No Known Allergies    Objective     Physical Exam:   Vital Signs:   Vitals:    08/26/24 0937   BP: 142/86   BP Location: Left arm   Patient Position: Sitting   Cuff Size: Adult   Pulse: 82   Temp: 97.7 °F (36.5 °C)   TempSrc: Temporal   SpO2: 98%   Weight: 83.5 kg (184 lb)   Height: 175.3 cm (69\")     Body mass index is 27.17 kg/m².   Physical Exam  Vitals and nursing note reviewed.   Constitutional:       General: He is not in acute distress.     Appearance: Normal appearance. He is not " ill-appearing.   HENT:      Head: Normocephalic and atraumatic.   Pulmonary:      Effort: Pulmonary effort is normal.   Genitourinary:     Comments: CHRISTINE deferred   Skin:     General: Skin is warm and dry.   Neurological:      General: No focal deficit present.      Mental Status: He is alert and oriented to person, place, and time.   Psychiatric:         Mood and Affect: Mood normal.         Behavior: Behavior normal.      Labs  Brief Urine Lab Results  (Last result in the past 365 days)        Color   Clarity   Blood   Leuk Est   Nitrite   Protein   CREAT   Urine HCG        08/26/24 0936 Ellen   Clear   Negative   Negative   Negative   Negative                 Lab Results   Component Value Date    GLUCOSE 104 (H) 12/08/2023    CALCIUM 9.2 12/08/2023     12/08/2023    K 3.6 12/08/2023    CO2 24.6 12/08/2023     12/08/2023    BUN 11 12/08/2023    CREATININE 0.92 12/08/2023    EGFRIFAFRI 107 09/27/2021    EGFRIFNONA 88 09/27/2021    BCR 12.0 12/08/2023    ANIONGAP 11.4 12/08/2023       Lab Results   Component Value Date    WBC 7.79 12/08/2023    HGB 16.2 12/08/2023    HCT 48.3 12/08/2023    MCV 86.3 12/08/2023     12/08/2023     Radiographic Studies  CT Angiogram Chest  Result Date: 5/3/2024  No significant interval change in dilatation of the ascending aorta, recommend 1 year follow-up.  Stable hepatic cyst.  CTDI: 2.34 mGy DLP:97.54 mGy.cm  This report was signed and finalized on 5/3/2024 7:42 AM by Susana Randall MD.      I have reviewed the above labs and imaging.     PVR: 314 ml.  Second  ml.      Assessment / Plan      Assessment/Plan: Mr. Eliazar Dan is a 54 y.o. male who presents today for follow up with history of BPH on Flomax and elevated PSA.  PSA has risen from 4.290 in December to 5.3 ng/mL on August 23rd.  His IPSS is 25 with most bothersome symptom being weak stream.  UA is benign.  PVR on 1st void was 314 ml.  PVR on second void 111 ml.      We discussed that the next  steps for evaluation are 4K score for risk of aggressive prostate cancer which has been drawn today.  Will have Mr. Dan follow up with Dr. Zeng for BPH work up which includes:     Cystoscopy: Dr. Zeng will take a small flexible cystoscope into the urethra and into the bladder.  This will show if there is any bladder damage and if your prostate is obstructing your urine flow.    TRUS (transrectal ultrasound): small rectal ultrasound probe is placed into the rectum.  This allows Dr. Zeng to measure the size of the prostate.     Uroflow: you will urinate into a funnel and we test your urine stream strength.      Mr. Dan is agreeable to this plan.  Questions/concerns addressed.  Will continue Flomax.      Diagnoses and all orders for this visit:    1. Elevated prostate specific antigen (PSA) (Primary)    2. Lower urinary tract symptoms (LUTS)  -     POC Urinalysis Dipstick, Automated    3. Acute urinary retention    Follow Up:   Return for with Dr. Zeng for BPH work up.  Dr. Zeng to review 4K score.  .    LARRY Fisher, MSN, FNP-C  [facility]

## 2024-09-10 DIAGNOSIS — R39.9 LOWER URINARY TRACT SYMPTOMS (LUTS): ICD-10-CM

## 2024-09-10 RX ORDER — TAMSULOSIN HYDROCHLORIDE 0.4 MG/1
1 CAPSULE ORAL NIGHTLY
Qty: 30 CAPSULE | Refills: 0 | Status: CANCELLED | OUTPATIENT
Start: 2024-09-10

## 2024-09-10 RX ORDER — TAMSULOSIN HYDROCHLORIDE 0.4 MG/1
1 CAPSULE ORAL NIGHTLY
Qty: 30 CAPSULE | Refills: 3 | Status: SHIPPED | OUTPATIENT
Start: 2024-09-10

## 2024-10-17 ENCOUNTER — PROCEDURE VISIT (OUTPATIENT)
Dept: UROLOGY | Facility: CLINIC | Age: 54
End: 2024-10-17
Payer: COMMERCIAL

## 2024-10-17 DIAGNOSIS — R39.9 LOWER URINARY TRACT SYMPTOMS (LUTS): Primary | ICD-10-CM

## 2024-10-17 DIAGNOSIS — R97.20 ELEVATED PROSTATE SPECIFIC ANTIGEN (PSA): ICD-10-CM

## 2024-10-17 NOTE — PROGRESS NOTES
CC  LUTS / BPH Workup    HPI  Ms. Dan is a 54 y.o. male with history below in assessment, who presents for follow up.     At this visit patient is here for BPH workup and discussion.     Past Medical History:   Diagnosis Date    Abnormal liver enzymes     Aneurysm 4/2022    Benign prostatic hyperplasia 9/21    Cataract 8/22    Coronary artery disease 3/2022    Elevated PSA 9/21    Epididymal cyst     Right, 1 cm continue to watch    Hyperlipidemia     Hypertension     Plantar fasciitis     Stable    Skin tag        Past Surgical History:   Procedure Laterality Date    CARDIAC CATHETERIZATION Left 05/18/2022    Procedure: Left Heart Cath;  Surgeon: Jairo Sawyer IV, MD;  Location:  [facility] CATH INVASIVE LOCATION;  Service: Cardiovascular;  Laterality: Left;    CARDIAC CATHETERIZATION      06/06/2022 PER DR. SAWYER    CARDIAC CATHETERIZATION N/A 06/06/2022    Procedure: Percutaneous Coronary Intervention to LAD;  Surgeon: Jairo Sawyer IV, MD;  Location:  [facility] INVASIVE LOC[facility];  Service: Cardiovascular;  Laterality: N/A;    CATARACT EXTRACTION Bilateral 2023    CORONARY ANGIOPLASTY  6/6/2022    CORONARY STENT PLACEMENT  6/6/22    PROSTATE BIOPSY N/A 01/15/2020    Procedure: TRANSRECTAL ULTRASOUND GUIDED BIOPSY;  Surgeon: Ritesh Ribera MD;  Location:  [facility] OR;  Service: Urology    WISDOM TOOTH EXTRACTION  2008         Current Outpatient Medications:     aspirin (Aspirin Low Dose) 81 MG EC tablet, Take 1 tablet by mouth Daily., Disp: 90 tablet, Rfl: 3    b complex vitamins capsule, Take 1 capsule by mouth Daily., Disp: 30 capsule, Rfl: 11    Cholecalciferol (Vitamin D3) 25 MCG (1000 UT) capsule, Take 1 capsule by mouth Daily., Disp: 90 capsule, Rfl: 11    cyclobenzaprine (FLEXERIL) 5 MG tablet, Take 2 tablets by mouth At Night As Needed for Muscle Spasms., Disp: 30 tablet, Rfl: 1    ezetimibe (ZETIA) 10 MG tablet, Take 1 tablet by mouth Daily., Disp: 90 tablet, Rfl: 3     metoprolol succinate XL (TOPROL-XL) 25 MG 24 hr tablet, Take 1 tablet by mouth Daily., Disp: 90 tablet, Rfl: 3    nitroglycerin (NITROSTAT) 0.4 MG SL tablet, Place 1 tablet under the tongue Every 5 (Five) Minutes As Needed for Chest Pain., Disp: 25 tablet, Rfl: 5    rosuvastatin (CRESTOR) 20 MG tablet, Take 1 tablet by mouth Daily., Disp: 90 tablet, Rfl: 3    tamsulosin (FLOMAX) 0.4 MG capsule 24 hr capsule, Take 1 capsule by mouth Every Night., Disp: 30 capsule, Rfl: 3     Physical Exam  There were no vitals taken for this visit.    Labs  Brief Urine Lab Results  (Last result in the past 365 days)        Color   Clarity   Blood   Leuk Est   Nitrite   Protein   CREAT   Urine HCG        08/26/24 0936 Ellen   Clear   Negative   Negative   Negative   Negative                   Lab Results   Component Value Date    GLUCOSE 104 (H) 12/08/2023    CALCIUM 9.2 12/08/2023     12/08/2023    K 3.6 12/08/2023    CO2 24.6 12/08/2023     12/08/2023    BUN 11 12/08/2023    CREATININE 0.92 12/08/2023    EGFRIFAFRI 107 09/27/2021    EGFRIFNONA 88 09/27/2021    BCR 12.0 12/08/2023    ANIONGAP 11.4 12/08/2023       Lab Results   Component Value Date    WBC 7.79 12/08/2023    HGB 16.2 12/08/2023    HCT 48.3 12/08/2023    MCV 86.3 12/08/2023     12/08/2023            Lab Results   Component Value Date    PSA 5.300 (H) 08/23/2024    PSA 4.290 (H) 12/08/2023    PSA 4.290 (H) 12/08/2023       Radiographic Studies  No Images in the past 120 days found..    I have reviewed above labs and imaging.     CYSTOSCOPY  Preprocedure diagnosis  LUTS  Postprocedure diagnosis  Same  Procedure  Flexible Cystourethroscopy  Attending surgeon  Alex Zeng MD  Anesthesia  2% lidocaine jelly intraurethrally  Complications  None  Indications  54 y.o. male undergoing a flexible cystoscopy for the above mentioned indications.  Informed consent was obtained.    Findings  Cystoscopic findings included one right and left ureteral  orifice in the normal anatomic position with normal bladder mucosa and no tumors, masses or stones. The urethral urothelium was within normal limits with no strictures.  There was a prominent median lobe.  The lateral lobes were also intravesical and quite obstructive in appearance.    Procedure  The patient was placed in supine position and prepped and draped in sterile fashion with lidocaine jelly per urethra for anesthesia.  A timeout was performed.  The 14F flexible cystoscope was lubricated and gently placed through the penile urethra and into the bladder.  The bladder was completely visualized.  The cystoscope was retroflexed and the bladder neck and prostate visualized.  The cystoscope was slowly withdrawn while visualizing the urethra and the procedure terminated.  The patient tolerated the procedure well.      TRUS OF PROSTATE   Preoperative diagnosis  LUTS  Postoperative diagnosis  Same  Procedure  1.  Transrectal ultrasound of the prostate  Attending Surgeon  Alex Zeng MD  Anesthesia  2% lidocaine jelly, intrarectal instillation, 10mL  Complications  None  Specimen  None  Indications  Mr. Dan is a 54 y.o. male with LUTS.  He presents for prostate ultrasound to evaluate prostate size.  Procedure  The patient was positioned and prepped in a left lateral position with lower extremities flexed.  Lidocaine jelly, 2%, was injected per rectum. A digital rectal exam was performed which demonstrated a smooth prostate without nodules or induration. The [email or web address] E8CS rectal ultrasound probe was slowly introduced into the rectum without difficulty.  The prostate and seminal vesicles were inspected systematically using cross and sagittal views with the ultrasound. The dimensions of the prostate were measured, for a calculated volume of 77 mL with 5.6 cm prostatic width.  The seminal vesicles appeared normal.  The rectal ultrasound probe was removed.  The patient tolerated the procedure well.    UROFLOW  Peak  flow rate -6.2 mL/sec  Average flow rate -2.8 mL/sec  Flow curve -low long and flat  Voided volume -158 mL    I personally reviewed  and interpreted this study.       Assessment  54 y.o. male with worsening of chronic lower urinary tract symptoms, history of BPH on Flomax and elevated PSA.  PSA has risen from 4.290 in December to 5.3 ng/mL on August 23rd.  His IPSS is 25 with most bothersome symptom being weak stream.  UA is benign.  PVR on 1st void was 314 ml.  PVR on second void 111 ml.  4K score low risk for prostate cancer.  Prostate large at 77 cc with significant intravesical component.  PSA density 0.07.    In a separate room and encounter after his workup, we discussed that his elevated PSA is likely from prostate growth.  He even had a negative biopsy 4 years ago in [place] with [facility] that we just discovered today.  He is significantly bothered by his lower urinary tract symptoms and is already on alpha-blocker.  I recommended HoLEP. He wants to think about his options.     Plan  1. FU in 3 weeks to discuss his decision    I spent a total of 30 minutes with the patient and the chart engaging in data gathering and interpretation, patient interaction, as well as counseling on the risks, benefits, and alternatives of the therapy and coordinating care.

## 2024-11-08 ENCOUNTER — TELEMEDICINE (OUTPATIENT)
Dept: UROLOGY | Facility: CLINIC | Age: 54
End: 2024-11-08
Payer: COMMERCIAL

## 2024-11-08 DIAGNOSIS — R39.9 LOWER URINARY TRACT SYMPTOMS (LUTS): Primary | ICD-10-CM

## 2024-11-08 DIAGNOSIS — R97.20 ELEVATED PROSTATE SPECIFIC ANTIGEN (PSA): ICD-10-CM

## 2024-11-08 RX ORDER — FINASTERIDE 5 MG/1
5 TABLET, FILM COATED ORAL DAILY
Qty: 90 TABLET | Refills: 4 | Status: SHIPPED | OUTPATIENT
Start: 2024-11-08

## 2024-11-08 NOTE — PROGRESS NOTES
CC  LUTS / BPH     HPI  Ms. Dan is a 54 y.o. male with history below in assessment, who presents for follow up.  Patient was at home and I was in the hospital for the video visit    Past Medical History:   Diagnosis Date    Abnormal liver enzymes     Aneurysm 4/2022    Benign prostatic hyperplasia 9/21    Cataract 8/22    Coronary artery disease 3/2022    Elevated PSA 9/21    Epididymal cyst     Right, 1 cm continue to watch    Hyperlipidemia     Hypertension     Plantar fasciitis     Stable    Skin tag        Past Surgical History:   Procedure Laterality Date    CARDIAC CATHETERIZATION Left 05/18/2022    Procedure: Left Heart Cath;  Surgeon: Jairo Sawyer IV, MD;  Location:  [facility];  Service: Cardiovascular;  Laterality: Left;    CARDIAC CATHETERIZATION      06/06/2022 PER DR. SAWYER    CARDIAC CATHETERIZATION N/A 06/06/2022    Procedure: Percutaneous Coronary Intervention to LAD;  Surgeon: Jairo Sawyer IV, MD;  Location: [facility]IVE LOCATION;  Service: Cardiovascular;  Laterality: N/A;    CATARACT EXTRACTION Bilateral 2023    CORONARY ANGIOPLASTY  6/6/2022    CORONARY STENT PLACEMENT  6/6/22    PROSTATE BIOPSY N/A 01/15/2020    Procedure: TRANSRECTAL ULTRASOUND GUIDED BIOPSY;  Surgeon: Ritesh Ribera MD;  Location:  [facility] OR;  Service: Urology    WISDOM TOOTH EXTRACTION  2008         Current Outpatient Medications:     aspirin (Aspirin Low Dose) 81 MG EC tablet, Take 1 tablet by mouth Daily., Disp: 90 tablet, Rfl: 3    b complex vitamins capsule, Take 1 capsule by mouth Daily., Disp: 30 capsule, Rfl: 11    Cholecalciferol (Vitamin D3) 25 MCG (1000 UT) capsule, Take 1 capsule by mouth Daily., Disp: 90 capsule, Rfl: 11    cyclobenzaprine (FLEXERIL) 5 MG tablet, Take 2 tablets by mouth At Night As Needed for Muscle Spasms., Disp: 30 tablet, Rfl: 1    ezetimibe (ZETIA) 10 MG tablet, Take 1 tablet by mouth Daily., Disp: 90 tablet, Rfl: 3    metoprolol  succinate XL (TOPROL-XL) 25 MG 24 hr tablet, Take 1 tablet by mouth Daily., Disp: 90 tablet, Rfl: 3    nitroglycerin (NITROSTAT) 0.4 MG SL tablet, Place 1 tablet under the tongue Every 5 (Five) Minutes As Needed for Chest Pain., Disp: 25 tablet, Rfl: 5    rosuvastatin (CRESTOR) 20 MG tablet, Take 1 tablet by mouth Daily., Disp: 90 tablet, Rfl: 3    tamsulosin (FLOMAX) 0.4 MG capsule 24 hr capsule, Take 1 capsule by mouth Every Night., Disp: 30 capsule, Rfl: 3     Physical Exam  There were no vitals taken for this visit.    Labs  Brief Urine Lab Results  (Last result in the past 365 days)        Color   Clarity   Blood   Leuk Est   Nitrite   Protein   CREAT   Urine HCG        08/26/24 0936 Ellen   Clear   Negative   Negative   Negative   Negative                   Lab Results   Component Value Date    GLUCOSE 104 (H) 12/08/2023    CALCIUM 9.2 12/08/2023     12/08/2023    K 3.6 12/08/2023    CO2 24.6 12/08/2023     12/08/2023    BUN 11 12/08/2023    CREATININE 0.92 12/08/2023    EGFRIFAFRI 107 09/27/2021    EGFRIFNONA 88 09/27/2021    BCR 12.0 12/08/2023    ANIONGAP 11.4 12/08/2023       Lab Results   Component Value Date    WBC 7.79 12/08/2023    HGB 16.2 12/08/2023    HCT 48.3 12/08/2023    MCV 86.3 12/08/2023     12/08/2023            Lab Results   Component Value Date    PSA 5.300 (H) 08/23/2024    PSA 4.290 (H) 12/08/2023    PSA 4.290 (H) 12/08/2023       Radiographic Studies  No Images in the past 120 days found..    I have reviewed above labs and imaging.     I personally reviewed  and interpreted this study.       Assessment  54 y.o. male with worsening of chronic lower urinary tract symptoms, history of BPH on Flomax and elevated PSA.  PSA has risen from 4.290 in December to 5.3 ng/mL on August 23rd.  His IPSS is 25 with most bothersome symptom being weak stream.  UA is benign.  PVR on 1st void was 314 ml.  PVR on second void 111 ml.  4K score low risk for prostate cancer.  Prostate large  at 77 cc with significant intravesical component.  PSA density 0.07.    Today we once again discussed medical and surgical options to treat the progression of his BPH.  He continues to be fairly bothered, but does not want surgery at this point.  We therefore discussed the risk, benefits, alternatives to adding a 5 alpha reductase inhibitor to his alpha-blocker.  We discussed risks of MARYANN, low libido, post finasteride syndrome.  We discussed that PSA needs to be doubled in the future when monitoring it.    Plan  1. Add finastreride to flomax  2. FU in 6 mo w IPSS and PVR, repeat PSA in 1 yr    This was an audio and video enabled telemedicine encounter.

## 2025-01-03 DIAGNOSIS — R39.9 LOWER URINARY TRACT SYMPTOMS (LUTS): ICD-10-CM

## 2025-01-03 RX ORDER — TAMSULOSIN HYDROCHLORIDE 0.4 MG/1
1 CAPSULE ORAL NIGHTLY
Qty: 30 CAPSULE | Refills: 3 | Status: SHIPPED | OUTPATIENT
Start: 2025-01-03

## 2025-02-21 ENCOUNTER — OFFICE VISIT (OUTPATIENT)
Dept: INTERNAL MEDICINE | Facility: CLINIC | Age: 55
End: 2025-02-21
Payer: COMMERCIAL

## 2025-02-21 VITALS
BODY MASS INDEX: 26.96 KG/M2 | HEIGHT: 69 IN | DIASTOLIC BLOOD PRESSURE: 72 MMHG | OXYGEN SATURATION: 96 % | SYSTOLIC BLOOD PRESSURE: 120 MMHG | RESPIRATION RATE: 16 BRPM | TEMPERATURE: 97.6 F | HEART RATE: 69 BPM | WEIGHT: 182 LBS

## 2025-02-21 DIAGNOSIS — I71.20 THORACIC AORTIC ANEURYSM WITHOUT RUPTURE, UNSPECIFIED PART: ICD-10-CM

## 2025-02-21 DIAGNOSIS — Z12.5 PROSTATE CANCER SCREENING: ICD-10-CM

## 2025-02-21 DIAGNOSIS — Z12.11 COLON CANCER SCREENING: Primary | ICD-10-CM

## 2025-02-21 NOTE — PROGRESS NOTES
"Subjective     Patient ID: Eliazar Dan is a 55 y.o. male. Patient is here for management of multiple medical problems.     Chief Complaint   Patient presents with    Annual Exam     History of Present Illness   Annual exam        The following portions of the patient's history were reviewed and updated as appropriate: allergies, current medications, past family history, past medical history, past social history, past surgical history and problem list.    Review of Systems    Current Outpatient Medications:     aspirin (Aspirin Low Dose) 81 MG EC tablet, Take 1 tablet by mouth Daily., Disp: 90 tablet, Rfl: 3    Cholecalciferol (Vitamin D3) 25 MCG (1000 UT) capsule, Take 1 capsule by mouth Daily., Disp: 90 capsule, Rfl: 11    cyclobenzaprine (FLEXERIL) 5 MG tablet, Take 2 tablets by mouth At Night As Needed for Muscle Spasms., Disp: 30 tablet, Rfl: 1    ezetimibe (ZETIA) 10 MG tablet, Take 1 tablet by mouth Daily., Disp: 90 tablet, Rfl: 3    finasteride (PROSCAR) 5 MG tablet, Take 1 tablet by mouth Daily., Disp: 90 tablet, Rfl: 4    metoprolol succinate XL (TOPROL-XL) 25 MG 24 hr tablet, Take 1 tablet by mouth Daily., Disp: 90 tablet, Rfl: 3    nitroglycerin (NITROSTAT) 0.4 MG SL tablet, Place 1 tablet under the tongue Every 5 (Five) Minutes As Needed for Chest Pain., Disp: 25 tablet, Rfl: 5    rosuvastatin (CRESTOR) 20 MG tablet, Take 1 tablet by mouth Daily., Disp: 90 tablet, Rfl: 3    tamsulosin (FLOMAX) 0.4 MG capsule 24 hr capsule, Take 1 capsule by mouth Every Night., Disp: 30 capsule, Rfl: 3    Objective      Blood pressure 120/72, pulse 69, temperature 97.6 °F (36.4 °C), resp. rate 16, height 175.3 cm (69\"), weight 82.6 kg (182 lb), SpO2 96%.    BMI is >= 25 and <30. (Overweight) The following options were offered after discussion;: weight loss educational material (shared in after visit summary), exercise counseling/recommendations, and nutrition counseling/recommendations       Physical " Exam     General Appearance:    Alert, cooperative, no distress, appears stated age   Head:    Normocephalic, without obvious abnormality, atraumatic   Eyes:    PERRL, conjunctiva/corneas clear, EOM's intact   Ears:    Normal TM's and external ear canals, both ears   Nose:   Nares normal, septum midline, mucosa normal, no drainage   or sinus tenderness   Throat:   Lips, mucosa, and tongue normal; teeth and gums normal   Neck:   Supple, symmetrical, trachea midline, no adenopathy;        thyroid:  No enlargement/tenderness/nodules; no carotid    bruit or JVD   Back:     Symmetric, no curvature, ROM normal, no CVA tenderness   Lungs:     Clear to auscultation bilaterally, respirations unlabored   Chest wall:    No tenderness or deformity   Heart:    Regular rate and rhythm, S1 and S2 normal, no murmur,        rub or gallop   Abdomen:     Soft, non-tender, bowel sounds active all four quadrants,     no masses, no organomegaly   Extremities:   Extremities normal, atraumatic, no cyanosis or edema   Pulses:   2+ and symmetric all extremities   Skin:   Skin color, texture, turgor normal, no rashes or lesions   Lymph nodes:   Cervical, supraclavicular, and axillary nodes normal   Neurologic:   CNII-XII intact. Normal strength, sensation and reflexes       throughout      Results for orders placed or performed in visit on 08/26/24   POC Urinalysis Dipstick, Automated    Collection Time: 08/26/24  9:36 AM    Specimen: Urine   Result Value Ref Range    Color Ellen Yellow, Straw, Dark Yellow, Ellen    Clarity, UA Clear Clear    Specific Gravity  1.015 1.005 - 1.030    pH, Urine 5.5 5.0 - 8.0    Leukocytes Negative Negative    Nitrite, UA Negative Negative    Protein, POC Negative Negative mg/dL    Glucose, UA Negative Negative mg/dL    Ketones, UA Negative Negative    Urobilinogen, UA Normal Normal, 0.2 E.U./dL    Bilirubin Negative Negative    Blood, UA Negative Negative    Lot Number [id number]     Expiration Date 5/31/2025           Assessment & Plan     TAA followed by Dr Sawyer.   Ct soon.      BPH stable on meds.      Diet going well    Exercise going well.      Wearing seatbelts.      Diagnoses and all orders for this visit:    1. Colon cancer screening (Primary)  -     Cologuard - Stool, Per Rectum; Future  -     Comprehensive Metabolic Panel  -     Vitamin B12  -     CBC & Differential  -     Lipid Panel  -     PSA Screen  -     TSH  -     Vitamin D,25-Hydroxy  -     Hemoglobin A1c    2. Prostate cancer screening  -     Comprehensive Metabolic Panel  -     Vitamin B12  -     CBC & Differential  -     Lipid Panel  -     PSA Screen  -     TSH  -     Vitamin D,25-Hydroxy  -     Hemoglobin A1c    3. Thoracic aortic aneurysm without rupture, unspecified part  -     Comprehensive Metabolic Panel  -     Vitamin B12  -     CBC & Differential  -     Lipid Panel  -     PSA Screen  -     TSH  -     Vitamin D,25-Hydroxy  -     Hemoglobin A1c      Return in about 1 year (around 2/21/2026).          There are no Patient Instructions on file for this visit.     Irineo Marley MD    Assessment & Plan

## 2025-03-20 DIAGNOSIS — I71.21 ANEURYSM OF ASCENDING AORTA WITHOUT RUPTURE: Primary | ICD-10-CM

## 2025-05-01 DIAGNOSIS — R39.9 LOWER URINARY TRACT SYMPTOMS (LUTS): ICD-10-CM

## 2025-05-02 RX ORDER — TAMSULOSIN HYDROCHLORIDE 0.4 MG/1
1 CAPSULE ORAL NIGHTLY
Qty: 30 CAPSULE | Refills: 3 | Status: SHIPPED | OUTPATIENT
Start: 2025-05-02

## 2025-05-05 ENCOUNTER — TELEPHONE (OUTPATIENT)
Dept: UROLOGY | Facility: CLINIC | Age: 55
End: 2025-05-05
Payer: COMMERCIAL

## 2025-05-05 NOTE — TELEPHONE ENCOUNTER
I called and spoke to patient and he stated seeing LARRY Candelario is okay. I scheduled him with LARRY Candelario

## 2025-05-08 DIAGNOSIS — E78.5 HYPERLIPIDEMIA LDL GOAL <70: Chronic | ICD-10-CM

## 2025-05-08 RX ORDER — EZETIMIBE 10 MG/1
10 TABLET ORAL DAILY
Qty: 90 TABLET | Refills: 3 | Status: SHIPPED | OUTPATIENT
Start: 2025-05-08

## 2025-05-15 DIAGNOSIS — E78.5 HYPERLIPIDEMIA LDL GOAL <70: Chronic | ICD-10-CM

## 2025-05-15 RX ORDER — ROSUVASTATIN CALCIUM 20 MG/1
20 TABLET, COATED ORAL DAILY
Qty: 90 TABLET | Refills: 0 | Status: SHIPPED | OUTPATIENT
Start: 2025-05-15

## 2025-05-15 NOTE — TELEPHONE ENCOUNTER
Lab Results   Component Value Date    GLUCOSE 104 (H) 12/08/2023    BUN 11 12/08/2023    CREATININE 0.92 12/08/2023     12/08/2023    K 3.6 12/08/2023     12/08/2023    CALCIUM 9.2 12/08/2023    PROTEINTOT 6.9 12/08/2023    ALBUMIN 4.5 12/08/2023    ALT 35 12/08/2023    AST 28 12/08/2023    ALKPHOS 82 12/08/2023    BILITOT 0.5 12/08/2023    GLOB 2.4 12/08/2023    AGRATIO 1.9 12/08/2023    BCR 12.0 12/08/2023    ANIONGAP 11.4 12/08/2023    EGFR 98.2 12/08/2023      Lab Results   Component Value Date    CHOL 121 12/08/2023    CHLPL 176 09/27/2021    TRIG 89 12/08/2023    HDL 34 (L) 12/08/2023    LDL 70 12/08/2023

## 2025-05-27 ENCOUNTER — HOSPITAL ENCOUNTER (OUTPATIENT)
Dept: CT IMAGING | Facility: HOSPITAL | Age: 55
Discharge: HOME | End: 2025-05-27
Admitting: NURSE PRACTITIONER
Payer: COMMERCIAL

## 2025-05-27 DIAGNOSIS — I71.21 ANEURYSM OF ASCENDING AORTA WITHOUT RUPTURE: ICD-10-CM

## 2025-05-27 PROCEDURE — 71275 CT ANGIOGRAPHY CHEST: CPT

## 2025-05-27 PROCEDURE — 25510000001 IOPAMIDOL PER 1 ML: Performed by: NURSE PRACTITIONER

## 2025-05-27 RX ORDER — IOPAMIDOL 755 MG/ML
100 INJECTION, SOLUTION INTRAVASCULAR
Status: COMPLETED | OUTPATIENT
Start: 2025-05-27 | End: 2025-05-27

## 2025-05-27 RX ADMIN — IOPAMIDOL 75 ML: 755 INJECTION, SOLUTION INTRAVENOUS at 15:57

## 2025-05-30 NOTE — PROGRESS NOTES
[facility] Cardiothoracic Surgery Office Follow Up Note     Date of Encounter: 2025     Name: Eliazar Dan  : 1970     Referred By: No ref. provider found  PCP: Irineo Marley MD    Chief Complaint:    Chief Complaint   Patient presents with    Aortic Aneurysm     1 year follow-up with CTA chest for ascending aortic aneurysm.       Subjective      History of Present Illness:    Eliazar Dan is a 55 y.o. male non-smoker followed by Dr. Rodriguez for ascending aortic aneurysm.  PMH: HTN, HLD CAD s/p PCI to LAD (Dr. Sawyer), and asending aortic aneurysm.  Prior to PCI for CAD lesion May 2022 , presented to [facility] ER for angina.  A CTA chest revealed incidental finding of 4.4 cm aortic aneurysm.  Presents today for annual surveillance.  States compliance with aspirin, Plavix, Zetia, and Crestor. BP @ goal.  Patient denies any unusual chest pain or back pain. FMH:  aneurysmal disease in father and one uncle.  TTE  demonstrated trileaflet aortic valve without regurgitation or stenosis.    Review of Systems:  Review of Systems   Constitutional: Negative for chills, decreased appetite, diaphoresis, fever, malaise/fatigue, night sweats, weight gain and weight loss.   HENT:  Negative for hoarse voice.    Eyes:  Negative for blurred vision, double vision and visual disturbance.   Cardiovascular:  Negative for chest pain, claudication, dyspnea on exertion, irregular heartbeat, leg swelling, near-syncope, orthopnea, palpitations, paroxysmal nocturnal dyspnea and syncope.   Respiratory:  Negative for cough, hemoptysis, shortness of breath, sputum production and wheezing.    Hematologic/Lymphatic: Negative for adenopathy and bleeding problem. Does not bruise/bleed easily.   Skin:  Negative for color change, nail changes, poor wound healing and rash.   Musculoskeletal:  Negative for back pain, falls and muscle cramps.   Gastrointestinal:  Negative for abdominal pain, dysphagia and heartburn.    Genitourinary:  Negative for flank pain.   Neurological:  Negative for brief paralysis, disturbances in coordination, dizziness, focal weakness, headaches, light-headedness, loss of balance, numbness, paresthesias, sensory change, vertigo and weakness.   Psychiatric/Behavioral:  Negative for depression and suicidal ideas.    Allergic/Immunologic: Negative for persistent infections.       I have reviewed the following portions of the patient's history: problem list, current medications, allergies, past surgical history, past medical history, past social history, past family history, and ROS and confirm it's accurate.    Allergies:  No Known Allergies    Medications:      Current Outpatient Medications:     aspirin (Aspirin Low Dose) 81 MG EC tablet, Take 1 tablet by mouth Daily., Disp: 90 tablet, Rfl: 3    Cholecalciferol (Vitamin D3) 25 MCG (1000 UT) capsule, Take 1 capsule by mouth Daily., Disp: 90 capsule, Rfl: 11    cyclobenzaprine (FLEXERIL) 5 MG tablet, Take 2 tablets by mouth At Night As Needed for Muscle Spasms., Disp: 30 tablet, Rfl: 1    ezetimibe (ZETIA) 10 MG tablet, Take 1 tablet by mouth Daily., Disp: 90 tablet, Rfl: 3    finasteride (PROSCAR) 5 MG tablet, Take 1 tablet by mouth Daily., Disp: 90 tablet, Rfl: 4    metoprolol succinate XL (TOPROL-XL) 25 MG 24 hr tablet, Take 1 tablet by mouth Daily., Disp: 90 tablet, Rfl: 3    nitroglycerin (NITROSTAT) 0.4 MG SL tablet, Place 1 tablet under the tongue Every 5 (Five) Minutes As Needed for Chest Pain., Disp: 25 tablet, Rfl: 5    rosuvastatin (CRESTOR) 20 MG tablet, Take 1 tablet by mouth Daily. Need labs for additional refills., Disp: 90 tablet, Rfl: 0    tamsulosin (FLOMAX) 0.4 MG capsule 24 hr capsule, Take 1 capsule by mouth Every Night., Disp: 30 capsule, Rfl: 3    History:   Past Medical History:   Diagnosis Date    Abnormal liver enzymes     Aneurysm 4/2022    Benign prostatic hyperplasia 9/21    Cataract 8/22    Coronary artery disease 3/2022     Elevated PSA 9/21    Epididymal cyst     Right, 1 cm continue to watch    Hyperlipidemia     Hypertension     Plantar fasciitis     Stable    Skin tag        Past Surgical History:   Procedure Laterality Date    CARDIAC CATHETERIZATION Left 05/18/2022    Procedure: Left Heart Cath;  Surgeon: Jairo Sawyer IV, MD;  Location:  [facility];  Service: Cardiovascular;  Laterality: Left;    CARDIAC CATHETERIZATION      06/06/2022 PER DR. SAWYER    CARDIAC CATHETERIZATION N/A 06/06/2022    Procedure: Percutaneous Coronary Intervention to LAD;  Surgeon: Jairo Sawyer IV, MD;  Location:  [facility];  Service: Cardiovascular;  Laterality: N/A;    CATARACT EXTRACTION Bilateral 2023    CORONARY ANGIOPLASTY  6/6/2022    CORONARY STENT PLACEMENT  6/6/22    PROSTATE BIOPSY N/A 01/15/2020    Procedure: TRANSRECTAL ULTRASOUND GUIDED BIOPSY;  Surgeon: Ritesh Ribera MD;  Location:  [facility] OR;  Service: Urology    WISDOM TOOTH EXTRACTION  2008       Social History     Socioeconomic History    Marital status:     Number of children: 1   Tobacco Use    Smoking status: Never     Passive exposure: Past    Smokeless tobacco: Never   Vaping Use    Vaping status: Never Used   Substance and Sexual Activity    Alcohol use: Not Currently     Comment: Rarely    Drug use: Never    Sexual activity: Yes     Partners: Female        Family History   Problem Relation Age of Onset    Heart disease Father     Hyperlipidemia Father     Heart attack Father     Hypertension Father     Hypertension Mother     Kidney disease Mother     Hyperlipidemia Sister     Hyperlipidemia Brother     Hyperlipidemia Other     Hypertension Other     Prostate cancer Neg Hx     Kidney cancer Neg Hx        Objective   Physical Exam:  Vitals:    06/03/25 0954 06/03/25 0955   BP: 126/84 130/90   BP Location: Right arm Left arm   Patient Position: Sitting Sitting   Pulse: 68    Temp: 96.9 °F (36.1 °C)   "  SpO2: 98%    Weight: 81.8 kg (180 lb 6.4 oz)    Height: 175.3 cm (69\")       Body mass index is 26.64 kg/m².    Physical Exam  Vitals reviewed.   Constitutional:       General: He is not in acute distress.     Appearance: He is not toxic-appearing.   HENT:      Head: Normocephalic and atraumatic.   Eyes:      General: Lids are normal.      Conjunctiva/sclera: Conjunctivae normal.      Pupils: Pupils are equal, round, and reactive to light.   Neck:      Vascular: No carotid bruit or JVD.   Cardiovascular:      Rate and Rhythm: Normal rate and regular rhythm.      Pulses:           Carotid pulses are 2+ on the right side and 2+ on the left side.       Radial pulses are 2+ on the right side and 2+ on the left side.      Heart sounds: S1 normal and S2 normal. No murmur heard.  Pulmonary:      Effort: Pulmonary effort is normal. No respiratory distress.      Breath sounds: Normal breath sounds.   Musculoskeletal:         General: Normal range of motion.      Cervical back: Normal range of motion and neck supple.      Right lower leg: No edema.      Left lower leg: No edema.   Skin:     General: Skin is warm and dry.      Capillary Refill: Capillary refill takes less than 2 seconds.   Neurological:      General: No focal deficit present.      Mental Status: He is alert and oriented to person, place, and time.   Psychiatric:         Attention and Perception: Attention normal.         Mood and Affect: Mood normal.         Speech: Speech normal.         Behavior: Behavior is cooperative.         Imaging/Labs:  CT Angiogram Chest: 5/27/2025 (Personally reviewed and measure ascending aorta 4.5 cm)  Impression: Ascending aortic aneurysm measuring 4.5 cm, not significantly changed compared to the prior exam. Electronically Signed: Alphonse Singh MD  5/27/2025      CT Angiogram Chest: 5/3/2024   Impression:  No significant interval change in dilatation of the ascending aorta, recommend 1 year follow-up.  Stable hepatic cyst. "   This report was signed on 5/3/2024 7:42 AM by Susana Randall MD.         CT Chest With & Without Contrast Diagnostic: 5/5/2023   Impression:  1. Stable dilatation of the ascending aorta at 45 mm, recommend one-year follow-up.    2. Stable hepatic cyst and bilateral adrenal lesions most consistent with benign adrenal adenomas.    3. Fatty liver.  This report was signed and finalized on 5/5/2023 5:13 PM by Susana Randall MD.      Duplex Carotid Ultrasound 5/18/22  Interpretation Summary  · No evidence of hemodynamically significant stenosis of bilateral carotid arteries.  · Intimal thickening and minimal scattered smooth plaque is noted.     TTE Interpretation Summary 5/23/22  Left ventricular systolic function is normal. Estimated left ventricular EF = 62%.  Left ventricular diastolic function was normal.  The cardiac valves are anatomically and functionally normal.     CT ANGIOGRAM CHEST W WO CONTRAST- 4/11/22    IMPRESSION:  1. Mild aneurysmal dilatation of the thoracic aorta, measuring 4.4 cm in greatest transverse dimension. There is no evidence of dissection or other acute process. No additional acute findings in the chest.  2.  Minimally atherosclerotic, nonaneurysmal abdominal aorta. No acute nonvascular findings in the abdomen.  3.  Nonspecific and favored benign 19 mm left adrenal nodule. This likely represents a benign adenoma but is incompletely characterized.  Report was finalized on 4/11/2022 1:48 PM by Liborio Trejo.  Assessment / Plan      Assessment / Plan:  1. Aneurysm of ascending aorta without rupture  - 55 y.o. male non-smoker followed by Dr. Rodriguez for ascending aortic aneurysm.   - PMH: HTN, HLD CAD s/p PCI to LAD (Dr. Sawyer), and asending aortic aneurysm.    - Prior to PCI for CAD lesion May 2022, presented to [facility] ER for angina where CTA chest revealed incidental finding of 4.4 cm aortic aneurysm.    - Presents today for annual surveillance: stable 4.5 cm TAA  - Compliant with aspirin,  Plavix, Zetia, and Crestor.   - BP borderline above goal.  States good BP control @ home readings  - Asymptomatic  - Non-smoker  - FMH:  aneurysmal disease in father and one uncle.    - TTE 2022 demonstrated trileaflet aortic valve without regurgitation or stenosis.   - Will plan annual surveillance w/ CTA chest and screen next time for abdominal aortic aneurysm w/ US AAA      Patient Education: Continue to maintain strict BP control w/ goal 130/80 mmHg or less.  Continue to avoid tobacco use.         Follow Up:   Return in about 1 year (around 6/3/2026) for CTA chest, US AAA.   Or sooner for any further concerns or worsening sign and symptoms. If unable to reach us in the office please dial 911 or go to the nearest emergency department.      LARRY Zhao  [facility] Cardiothoracic Surgery    Time Spent: I spent 24 minutes caring for Eliazar on this date of service. This time includes time spent by me in the following activities: preparing for the visit, reviewing tests, obtaining and/or reviewing a separately obtained history, performing a medically appropriate examination and/or evaluation, counseling and educating the patient/family/caregiver, documenting information in the medical record, independently interpreting results and communicating that information with the patient/family/caregiver, and care coordination.

## 2025-06-03 ENCOUNTER — OFFICE VISIT (OUTPATIENT)
Dept: CARDIAC SURGERY | Facility: CLINIC | Age: 55
End: 2025-06-03
Payer: COMMERCIAL

## 2025-06-03 VITALS
TEMPERATURE: 96.9 F | BODY MASS INDEX: 26.72 KG/M2 | SYSTOLIC BLOOD PRESSURE: 130 MMHG | OXYGEN SATURATION: 98 % | HEIGHT: 69 IN | DIASTOLIC BLOOD PRESSURE: 90 MMHG | HEART RATE: 68 BPM | WEIGHT: 180.4 LBS

## 2025-06-03 DIAGNOSIS — I71.21 ANEURYSM OF ASCENDING AORTA WITHOUT RUPTURE: Primary | ICD-10-CM

## 2025-06-03 PROCEDURE — 99213 OFFICE O/P EST LOW 20 MIN: CPT | Performed by: NURSE PRACTITIONER

## 2025-07-09 ENCOUNTER — LAB (OUTPATIENT)
Dept: LAB | Facility: HOSPITAL | Age: 55
End: 2025-07-09
Payer: COMMERCIAL

## 2025-07-09 LAB
25(OH)D3 SERPL-MCNC: 33.1 NG/ML (ref 30–100)
ALBUMIN SERPL-MCNC: 4.1 G/DL (ref 3.5–5.2)
ALBUMIN/GLOB SERPL: 1.4 G/DL
ALP SERPL-CCNC: 74 U/L (ref 39–117)
ALT SERPL W P-5'-P-CCNC: 29 U/L (ref 1–41)
ANION GAP SERPL CALCULATED.3IONS-SCNC: 11.6 MMOL/L (ref 5–15)
AST SERPL-CCNC: 29 U/L (ref 1–40)
BASOPHILS # BLD AUTO: 0.03 10*3/MM3 (ref 0–0.2)
BASOPHILS NFR BLD AUTO: 0.4 % (ref 0–1.5)
BILIRUB SERPL-MCNC: 0.6 MG/DL (ref 0–1.2)
BUN SERPL-MCNC: 14 MG/DL (ref 6–20)
BUN/CREAT SERPL: 14.7 (ref 7–25)
CALCIUM SPEC-SCNC: 9.5 MG/DL (ref 8.6–10.5)
CHLORIDE SERPL-SCNC: 108 MMOL/L (ref 98–107)
CHOLEST SERPL-MCNC: 125 MG/DL (ref 0–200)
CO2 SERPL-SCNC: 22.4 MMOL/L (ref 22–29)
CREAT SERPL-MCNC: 0.95 MG/DL (ref 0.76–1.27)
DEPRECATED RDW RBC AUTO: 41.6 FL (ref 37–54)
EGFRCR SERPLBLD CKD-EPI 2021: 93.4 ML/MIN/1.73
EOSINOPHIL # BLD AUTO: 0.06 10*3/MM3 (ref 0–0.4)
EOSINOPHIL NFR BLD AUTO: 0.8 % (ref 0.3–6.2)
ERYTHROCYTE [DISTWIDTH] IN BLOOD BY AUTOMATED COUNT: 12.4 % (ref 12.3–15.4)
GLOBULIN UR ELPH-MCNC: 3 GM/DL
GLUCOSE SERPL-MCNC: 107 MG/DL (ref 65–99)
HBA1C MFR BLD: 5.3 % (ref 4.8–5.6)
HCT VFR BLD AUTO: 49.2 % (ref 37.5–51)
HDLC SERPL-MCNC: 33 MG/DL (ref 40–60)
HGB BLD-MCNC: 16.3 G/DL (ref 13–17.7)
IMM GRANULOCYTES # BLD AUTO: 0.03 10*3/MM3 (ref 0–0.05)
IMM GRANULOCYTES NFR BLD AUTO: 0.4 % (ref 0–0.5)
LDLC SERPL CALC-MCNC: 75 MG/DL (ref 0–100)
LDLC/HDLC SERPL: 2.25 {RATIO}
LYMPHOCYTES # BLD AUTO: 2.07 10*3/MM3 (ref 0.7–3.1)
LYMPHOCYTES NFR BLD AUTO: 28.4 % (ref 19.6–45.3)
MCH RBC QN AUTO: 30.4 PG (ref 26.6–33)
MCHC RBC AUTO-ENTMCNC: 33.1 G/DL (ref 31.5–35.7)
MCV RBC AUTO: 91.8 FL (ref 79–97)
MONOCYTES # BLD AUTO: 0.65 10*3/MM3 (ref 0.1–0.9)
MONOCYTES NFR BLD AUTO: 8.9 % (ref 5–12)
NEUTROPHILS NFR BLD AUTO: 4.44 10*3/MM3 (ref 1.7–7)
NEUTROPHILS NFR BLD AUTO: 61.1 % (ref 42.7–76)
NRBC BLD AUTO-RTO: 0 /100 WBC (ref 0–0.2)
PLATELET # BLD AUTO: 200 10*3/MM3 (ref 140–450)
PMV BLD AUTO: 9.8 FL (ref 6–12)
POTASSIUM SERPL-SCNC: 3.7 MMOL/L (ref 3.5–5.2)
PROT SERPL-MCNC: 7.1 G/DL (ref 6–8.5)
PSA SERPL-MCNC: 2.62 NG/ML (ref 0–4)
RBC # BLD AUTO: 5.36 10*6/MM3 (ref 4.14–5.8)
SODIUM SERPL-SCNC: 142 MMOL/L (ref 136–145)
TRIGL SERPL-MCNC: 89 MG/DL (ref 0–150)
TSH SERPL DL<=0.05 MIU/L-ACNC: 1.86 UIU/ML (ref 0.27–4.2)
VIT B12 BLD-MCNC: 245 PG/ML (ref 211–946)
VLDLC SERPL-MCNC: 17 MG/DL (ref 5–40)
WBC NRBC COR # BLD AUTO: 7.28 10*3/MM3 (ref 3.4–10.8)

## 2025-07-09 PROCEDURE — G0103 PSA SCREENING: HCPCS | Performed by: INTERNAL MEDICINE

## 2025-07-09 PROCEDURE — 80050 GENERAL HEALTH PANEL: CPT | Performed by: INTERNAL MEDICINE

## 2025-07-09 PROCEDURE — 83036 HEMOGLOBIN GLYCOSYLATED A1C: CPT | Performed by: INTERNAL MEDICINE

## 2025-07-09 PROCEDURE — 80061 LIPID PANEL: CPT | Performed by: INTERNAL MEDICINE

## 2025-07-09 PROCEDURE — 82306 VITAMIN D 25 HYDROXY: CPT | Performed by: INTERNAL MEDICINE

## 2025-07-09 PROCEDURE — 82607 VITAMIN B-12: CPT | Performed by: INTERNAL MEDICINE

## 2025-07-10 ENCOUNTER — OFFICE VISIT (OUTPATIENT)
Dept: UROLOGY | Facility: CLINIC | Age: 55
End: 2025-07-10
Payer: COMMERCIAL

## 2025-07-10 VITALS
OXYGEN SATURATION: 98 % | RESPIRATION RATE: 14 BRPM | HEIGHT: 69 IN | TEMPERATURE: 98.5 F | BODY MASS INDEX: 26.71 KG/M2 | HEART RATE: 70 BPM | WEIGHT: 180.34 LBS

## 2025-07-10 DIAGNOSIS — R97.20 BPH WITH ELEVATED PSA: ICD-10-CM

## 2025-07-10 DIAGNOSIS — R97.20 ELEVATED PROSTATE SPECIFIC ANTIGEN (PSA): ICD-10-CM

## 2025-07-10 DIAGNOSIS — R39.9 LOWER URINARY TRACT SYMPTOMS (LUTS): Primary | ICD-10-CM

## 2025-07-10 DIAGNOSIS — N40.0 BPH WITH ELEVATED PSA: ICD-10-CM

## 2025-07-10 DIAGNOSIS — R33.8 ACUTE URINARY RETENTION: ICD-10-CM

## 2025-07-10 NOTE — PROGRESS NOTES
Office Visit     Patient Name: Eliazar Dan  : 1970   MRN: [id number]     Chief Complaint  Chief Complaint   Patient presents with    Elevated PSA       Referring Provider: No ref. provider found    Primary Care Provider: Irineo Marley MD     Subjective     History of Present Illness  The patient is a 55-year-old male presenting with chronic lower urinary tract symptoms.    Lower Urinary Tract Symptoms  - History of incomplete emptying with postvoid residuals up to 300 cm³.  - Despite treatment with tamsulosin and finasteride, recent residual is 180 cm³.  - Prostate volume is approximately 80 cm³ on MRI, with no cancer lesions identified.  - IPSS score is 18, with the most significant symptom being intermittency.  - Experiences nocturia 3 times per night, though not every night.  - Reduced evening fluid intake.  - PSA has decreased to 2, with a true PSA estimated at ~5.2 due to finasteride use.  - Ejaculation is not dry while on tamsulosin.       IPSS Questionnaire (AUA-7):                Past Medical History:   Diagnosis Date    Abnormal liver enzymes     Aneurysm 2022    Angina pectoris 2022    Benign prostatic hyperplasia     Cataract     Coronary artery disease 3/2022    Elevated PSA     Epididymal cyst     Right, 1 cm continue to watch    Hyperlipidemia     Hypertension     Plantar fasciitis     Stable    Skin tag      Past Surgical History:   Procedure Laterality Date    CARDIAC CATHETERIZATION Left 2022    Procedure: Left Heart Cath;  Surgeon: Jairo Sawyer IV, MD;  Location:  [facility];  Service: Cardiovascular;  Laterality: Left;    CARDIAC CATHETERIZATION      2022 PER DR. SAWYER    CARDIAC CATHETERIZATION N/A 2022    Procedure: Percutaneous Coronary Intervention to LAD;  Surgeon: Jairo Sawyer IV, MD;  Location:  [facility];  Service: Cardiovascular;  Laterality: N/A;    CATARACT  EXTRACTION Bilateral 2023    CORONARY ANGIOPLASTY  6/6/2022    CORONARY STENT PLACEMENT  6/6/22    PROSTATE BIOPSY N/A 01/15/2020    Procedure: TRANSRECTAL ULTRASOUND GUIDED BIOPSY;  Surgeon: Ritesh Ribera MD;  Location: [facility];  Service: Urology    WISDOM TOOTH EXTRACTION  2008     Family History   Problem Relation Age of Onset    Heart disease Father     Hyperlipidemia Father     Heart attack Father     Hypertension Father     Aneurysm Father     Hypertension Mother     Kidney disease Mother     Hyperlipidemia Sister     Hyperlipidemia Brother     Hyperlipidemia Other     Hypertension Other     Prostate cancer Neg Hx     Kidney cancer Neg Hx      Social History     Socioeconomic History    Marital status:     Number of children: 1   Tobacco Use    Smoking status: Never     Passive exposure: Past    Smokeless tobacco: Never   Vaping Use    Vaping status: Never Used   Substance and Sexual Activity    Alcohol use: Never     Comment: Rarely    Drug use: Never    Sexual activity: Not Currently     Partners: Female       Current Outpatient Medications:     aspirin (Aspirin Low Dose) 81 MG EC tablet, Take 1 tablet by mouth Daily., Disp: 90 tablet, Rfl: 3    Cholecalciferol (Vitamin D3) 25 MCG (1000 UT) capsule, Take 1 capsule by mouth Daily., Disp: 90 capsule, Rfl: 11    cyclobenzaprine (FLEXERIL) 5 MG tablet, Take 2 tablets by mouth At Night As Needed for Muscle Spasms., Disp: 30 tablet, Rfl: 1    ezetimibe (ZETIA) 10 MG tablet, Take 1 tablet by mouth Daily., Disp: 90 tablet, Rfl: 3    finasteride (PROSCAR) 5 MG tablet, Take 1 tablet by mouth Daily., Disp: 90 tablet, Rfl: 4    metoprolol succinate XL (TOPROL-XL) 25 MG 24 hr tablet, Take 1 tablet by mouth Daily., Disp: 90 tablet, Rfl: 3    nitroglycerin (NITROSTAT) 0.4 MG SL tablet, Place 1 tablet under the tongue Every 5 (Five) Minutes As Needed for Chest Pain., Disp: 25 tablet, Rfl: 5    rosuvastatin (CRESTOR) 20 MG tablet, Take 1 tablet by mouth Daily.  "Need labs for additional refills., Disp: 90 tablet, Rfl: 0    tamsulosin (FLOMAX) 0.4 MG capsule 24 hr capsule, Take 1 capsule by mouth Every Night., Disp: 30 capsule, Rfl: 3  No Known Allergies  Objective   Visit Vitals  Pulse 70   Temp 98.5 °F (36.9 °C) (Infrared)   Resp 14   Ht 175.3 cm (69.02\")   Wt 81.8 kg (180 lb 5.4 oz)   SpO2 98%   BMI 26.62 kg/m²      Body mass index is 26.62 kg/m².     Physical exam was notable for: thin    Labs  Lab Results   Component Value Date    COLORU Ellen 08/26/2024    CLARITYU Clear 08/26/2024    SPECGRAV 1.015 08/26/2024    PHUR 5.5 08/26/2024    LEUKOCYTESUR Negative 08/26/2024    NITRITE Negative 08/26/2024    PROTEINPOCUA Negative 08/26/2024    GLUCOSEUR Negative 08/26/2024    KETONESU Negative 08/26/2024    UROBILINOGEN Normal 08/26/2024    BILIRUBINUR Negative 08/26/2024    RBCUR Negative 08/26/2024      No results found for: \"WBCUA\", \"RBCUA\", \"BACTERIA\", \"HYALCASTU\", \"SQUAMEPIUA\"     Lab Results   Component Value Date    WBC 7.28 07/09/2025    HGB 16.3 07/09/2025    HCT 49.2 07/09/2025    MCV 91.8 07/09/2025     07/09/2025     Lab Results   Component Value Date    GLUCOSE 107 (H) 07/09/2025    CALCIUM 9.5 07/09/2025     07/09/2025    K 3.7 07/09/2025    CO2 22.4 07/09/2025     (H) 07/09/2025    BUN 14.0 07/09/2025    BUN 11 12/08/2023    CREATININE 0.95 07/09/2025    CREATININE 0.92 12/08/2023    EGFR 93.4 07/09/2025    EGFR 98.2 12/08/2023    BCR 14.7 07/09/2025    ANIONGAP 11.6 07/09/2025    ALT 29 07/09/2025    AST 29 07/09/2025       Lab Results   Component Value Date    HGBA1C 5.30 07/09/2025        Lab Results   Component Value Date    PSA 2.620 07/09/2025    PSA 5.300 (H) 08/23/2024    PSA 4.290 (H) 12/08/2023    PSA 4.290 (H) 12/08/2023       No results found for: \"URICACIDSTN\", \"GONQ7KKPDSC\", \"QMOW2XMUSL\", \"LABMAGN\"  Lab Results   Component Value Date    NMPQ54OV 33.1 07/09/2025     No results found for: \"CYSTINE\", \"URINEVOLUM\", \"CALCIUMUR\", " "\"OXALATEU\", \"CITRATEUR\", \"LABPH\", \"URICUR\", \"NAUR\", \"KUR\", \"MAGNESIUMUR\", \"PHOSUR\", \"AMMONIUMUR\", \"CLUR\", \"[id number]\", \"UREAUR\", \"LABCREAUR\"    Lab Results   Component Value Date    PSA 2.620 07/09/2025         Radiographic Studies  CT Angiogram Chest  Result Date: 5/27/2025  Impression: Ascending aortic aneurysm measuring 4.5 cm, not significantly changed compared to the prior exam. Electronically Signed: Alphonse Singh MD  5/27/2025 5:11 PM EDT  Workstation ID: [id number]      I have reviewed the above labs and imaging.     PVR  Post-void residual performed with ultrasound by staff and interpreted by me - 180 mL    Assessment / Plan      Diagnoses and all orders for this visit:    1. Lower urinary tract symptoms (LUTS) (Primary)    2. Acute urinary retention    3. Elevated prostate specific antigen (PSA)    4. BPH with elevated PSA         Assessment & Plan  1. Chronic lower urinary tract symptoms  - History of incomplete emptying with postvoid residuals up to 300 cm³  - On tamsulosin and finasteride, recent residual 180 cm³  - Prostate volume ~80 cm³ on MRI, no cancer lesions  - IPSS 18, most significant symptom is intermittency  - Nocturia 3 times/night, not every night  - Reduced evening fluid intake  - PSA decreased to 2 (true PSA ~5.2 with finasteride)    Discussed aquablation to improve urinary function  - Outpatient procedure (~1 hour) using telescope, ultrasound, and water jet to create larger urine channel, with option to preserve ejaculation  - Procedure at [facility]  - Provided brochure  - Considering scheduling on 08/04/2025 before doctor's departure in September         Patient or patient representative verbalized consent for the use of Ambient Listening during the visit with  Alex Zeng MD for chart documentation. 7/10/2025  09:22 EDT    Alex Zeng MD  Urologic Surgery  [facility]  [id number]   [place] [address]    "

## 2025-07-15 DIAGNOSIS — I25.119 CORONARY ARTERY DISEASE INVOLVING NATIVE CORONARY ARTERY OF NATIVE HEART WITH ANGINA PECTORIS: ICD-10-CM

## 2025-07-15 RX ORDER — METOPROLOL SUCCINATE 25 MG/1
25 TABLET, EXTENDED RELEASE ORAL DAILY
Qty: 90 TABLET | Refills: 3 | Status: SHIPPED | OUTPATIENT
Start: 2025-07-15

## 2025-07-28 DIAGNOSIS — E78.5 HYPERLIPIDEMIA LDL GOAL <70: Chronic | ICD-10-CM

## 2025-07-28 RX ORDER — ROSUVASTATIN CALCIUM 20 MG/1
20 TABLET, COATED ORAL DAILY
Qty: 90 TABLET | Refills: 0 | Status: SHIPPED | OUTPATIENT
Start: 2025-07-28

## 2025-07-28 NOTE — TELEPHONE ENCOUNTER
Lab Results   Component Value Date    CHOL 125 07/09/2025    CHLPL 176 09/27/2021    TRIG 89 07/09/2025    HDL 33 (L) 07/09/2025    LDL 75 07/09/2025      Lab Results   Component Value Date    GLUCOSE 107 (H) 07/09/2025    BUN 14.0 07/09/2025    CREATININE 0.95 07/09/2025     07/09/2025    K 3.7 07/09/2025     (H) 07/09/2025    CALCIUM 9.5 07/09/2025    PROTEINTOT 7.1 07/09/2025    ALBUMIN 4.1 07/09/2025    ALT 29 07/09/2025    AST 29 07/09/2025    ALKPHOS 74 07/09/2025    BILITOT 0.6 07/09/2025    GLOB 3.0 07/09/2025    AGRATIO 1.4 07/09/2025    BCR 14.7 07/09/2025    ANIONGAP 11.6 07/09/2025    EGFR 93.4 07/09/2025

## 2025-08-20 ENCOUNTER — OFFICE VISIT (OUTPATIENT)
Dept: CARDIOLOGY | Facility: CLINIC | Age: 55
End: 2025-08-20
Payer: COMMERCIAL

## 2025-08-20 VITALS
DIASTOLIC BLOOD PRESSURE: 78 MMHG | HEIGHT: 69 IN | WEIGHT: 177.2 LBS | HEART RATE: 82 BPM | SYSTOLIC BLOOD PRESSURE: 130 MMHG | OXYGEN SATURATION: 97 % | BODY MASS INDEX: 26.25 KG/M2

## 2025-08-20 DIAGNOSIS — I25.119 CORONARY ARTERY DISEASE INVOLVING NATIVE CORONARY ARTERY OF NATIVE HEART WITH ANGINA PECTORIS: Primary | ICD-10-CM

## 2025-08-20 DIAGNOSIS — Z82.49 FAMILY HISTORY OF AORTIC ANEURYSM: ICD-10-CM

## 2025-08-20 DIAGNOSIS — I71.20 THORACIC AORTIC ANEURYSM WITHOUT RUPTURE, UNSPECIFIED PART: ICD-10-CM

## 2025-08-20 DIAGNOSIS — E78.5 HYPERLIPIDEMIA LDL GOAL <70: Chronic | ICD-10-CM

## 2025-08-20 RX ORDER — NITROGLYCERIN 0.4 MG/1
0.4 TABLET SUBLINGUAL
Qty: 25 TABLET | Refills: 5 | Status: SHIPPED | OUTPATIENT
Start: 2025-08-20

## 2025-08-20 RX ORDER — ROSUVASTATIN CALCIUM 40 MG/1
40 TABLET, COATED ORAL DAILY
Qty: 90 TABLET | Refills: 3 | Status: SHIPPED | OUTPATIENT
Start: 2025-08-20

## (undated) DEVICE — CVR TRANSD NEOGUARD 2X30CM LF STRL

## (undated) DEVICE — PK CATH CARD 10

## (undated) DEVICE — SYR LL TP 10ML STRL

## (undated) DEVICE — CATH DIAG EXPO M/ PK 6FR FL4/FR4 PIG 3PK

## (undated) DEVICE — PAD ARMBRD SURG CONVOL 7.5X20X2IN

## (undated) DEVICE — MODEL AT P65, P/N 701554-001KIT CONTENTS: HAND CONTROLLER, 3-WAY HIGH-PRESSURE STOPCOCK WITH ROTATING END AND PREMIUM HIGH-PRESSURE TUBING: Brand: ANGIOTOUCH® KIT

## (undated) DEVICE — GLIDESHEATH BASIC HYDROPHILIC COATED INTRODUCER SHEATH: Brand: GLIDESHEATH

## (undated) DEVICE — NC TREK CORONARY DILATATION CATHETER 3.5 MM X 15 MM / RAPID-EXCHANGE: Brand: NC TREK

## (undated) DEVICE — GUIDE CATHETER: Brand: MACH1™

## (undated) DEVICE — MODEL BT2000 P/N 700287-012KIT CONTENTS: MANIFOLD WITH SALINE AND CONTRAST PORTS, SALINE TUBING WITH SPIKE AND HAND SYRINGE, TRANSDUCER: Brand: BT2000 AUTOMATED MANIFOLD KIT

## (undated) DEVICE — GLV SURG SENSICARE MICRO PF LF 7.5 STRL

## (undated) DEVICE — MAX-CORE® DISPOSABLE CORE BIOPSY INSTRUMENT, 18G X 20CM: Brand: MAX-CORE

## (undated) DEVICE — DEV COMP RAD PRELUDESYNC 24CM

## (undated) DEVICE — GW PERIPH GUIDERIGHT STD/EXCHNG/J/TIP SS 0.035IN 5X260CM

## (undated) DEVICE — CVR TRANSD FLX 3DIMEN 14X29.2CM LF STRL

## (undated) DEVICE — TREK CORONARY DILATATION CATHETER 2.50 MM X 15 MM / RAPID-EXCHANGE: Brand: TREK

## (undated) DEVICE — KT CATH IMG DRAGONFLY OPTIS 2.7F 135CM

## (undated) DEVICE — CONTAINER,SPECIMEN,OR STERILE,4OZ: Brand: MEDLINE

## (undated) DEVICE — NDL SPINE 22G 7IN BLK

## (undated) DEVICE — SHEET,DRAPE,40X58,STERILE: Brand: MEDLINE

## (undated) DEVICE — CATH DIAG EXPO .056 FL3.5 6F 100CM

## (undated) DEVICE — COPILOT BLEEDBACK CONTROL VALVE: Brand: COPILOT

## (undated) DEVICE — Device: Brand: ASAHI SION BLUE

## (undated) DEVICE — NDL ANGIOGR ADV THN SMOTH SGLWALL 21G 1.5

## (undated) DEVICE — DEV INFL MONARCH 25W

## (undated) DEVICE — XIENCE SKYPOINT™ EVEROLIMUS ELUTING CORONARY STENT SYSTEM 3.50 MM X 33 MM / RAPID-EXCHANGE
Type: IMPLANTABLE DEVICE | Status: NON-FUNCTIONAL
Brand: XIENCE SKYPOINT™

## (undated) DEVICE — STERILE ULTRASOUND GEL, SAFEWRAP: Brand: ECOVUE